# Patient Record
Sex: FEMALE | Race: OTHER | Employment: UNEMPLOYED | URBAN - METROPOLITAN AREA
[De-identification: names, ages, dates, MRNs, and addresses within clinical notes are randomized per-mention and may not be internally consistent; named-entity substitution may affect disease eponyms.]

---

## 2017-04-03 DIAGNOSIS — E03.1 CONGENITAL HYPOTHYROIDISM WITHOUT GOITER: ICD-10-CM

## 2017-04-03 DIAGNOSIS — F51.01 PRIMARY INSOMNIA: ICD-10-CM

## 2017-04-03 RX ORDER — SERTRALINE HYDROCHLORIDE 25 MG/1
25 TABLET, FILM COATED ORAL DAILY
Qty: 90 TAB | Refills: 1 | Status: SHIPPED | OUTPATIENT
Start: 2017-04-03 | End: 2017-04-24 | Stop reason: SDUPTHER

## 2017-04-03 RX ORDER — LEVOTHYROXINE SODIUM 200 UG/1
200 TABLET ORAL
Qty: 90 TAB | Refills: 1 | Status: SHIPPED | OUTPATIENT
Start: 2017-04-03 | End: 2017-04-24 | Stop reason: SDUPTHER

## 2017-04-03 NOTE — TELEPHONE ENCOUNTER
Patient requesting the following medication refill     Medication requesting:    levothyroxine (Synthroid) 200mcg tablet  Sertraline (Zoloft) 25mg tablet    Date last prescribed 11/03/2016    QTY 90 Refills 1    Date patient last seen 11/03/2016    Follow up appt scheduled for 04/24/2017    Please advise: Patient requesting pharmacy change.  Pharmacy updated in chart

## 2017-04-21 ENCOUNTER — HOSPITAL ENCOUNTER (OUTPATIENT)
Dept: LAB | Age: 52
Discharge: HOME | End: 2017-04-21
Payer: OTHER GOVERNMENT

## 2017-04-21 DIAGNOSIS — Z23 ENCOUNTER FOR IMMUNIZATION: ICD-10-CM

## 2017-04-21 DIAGNOSIS — E03.1 CONGENITAL HYPOTHYROIDISM WITHOUT GOITER: ICD-10-CM

## 2017-04-21 DIAGNOSIS — F51.01 PRIMARY INSOMNIA: ICD-10-CM

## 2017-04-21 DIAGNOSIS — E11.21 CONTROLLED TYPE 2 DIABETES MELLITUS WITH DIABETIC NEPHROPATHY, WITHOUT LONG-TERM CURRENT USE OF INSULIN (HCC): ICD-10-CM

## 2017-04-21 LAB
ALBUMIN SERPL BCP-MCNC: 3.9 G/DL (ref 3.4–5)
ALBUMIN/GLOB SERPL: 1.1 {RATIO} (ref 0.8–1.7)
ALP SERPL-CCNC: 75 U/L (ref 45–117)
ALT SERPL-CCNC: 27 U/L (ref 13–56)
ANION GAP BLD CALC-SCNC: 11 MMOL/L (ref 3–18)
APPEARANCE UR: ABNORMAL
AST SERPL W P-5'-P-CCNC: 13 U/L (ref 15–37)
BACTERIA URNS QL MICRO: ABNORMAL /HPF
BASOPHILS # BLD AUTO: 0 K/UL (ref 0–0.06)
BASOPHILS # BLD: 1 % (ref 0–2)
BILIRUB SERPL-MCNC: 0.3 MG/DL (ref 0.2–1)
BILIRUB UR QL: NEGATIVE
BUN SERPL-MCNC: 15 MG/DL (ref 7–18)
BUN/CREAT SERPL: 21 (ref 12–20)
CALCIUM SERPL-MCNC: 9.2 MG/DL (ref 8.5–10.1)
CHLORIDE SERPL-SCNC: 101 MMOL/L (ref 100–108)
CHOLEST SERPL-MCNC: 236 MG/DL
CO2 SERPL-SCNC: 26 MMOL/L (ref 21–32)
COLOR UR: YELLOW
CREAT SERPL-MCNC: 0.73 MG/DL (ref 0.6–1.3)
DIFFERENTIAL METHOD BLD: ABNORMAL
EOSINOPHIL # BLD: 0.1 K/UL (ref 0–0.4)
EOSINOPHIL NFR BLD: 2 % (ref 0–5)
EPITH CASTS URNS QL MICRO: ABNORMAL /LPF (ref 0–5)
ERYTHROCYTE [DISTWIDTH] IN BLOOD BY AUTOMATED COUNT: 13.6 % (ref 11.6–14.5)
GLOBULIN SER CALC-MCNC: 3.6 G/DL (ref 2–4)
GLUCOSE SERPL-MCNC: 162 MG/DL (ref 74–99)
GLUCOSE UR STRIP.AUTO-MCNC: NEGATIVE MG/DL
HCT VFR BLD AUTO: 41.2 % (ref 35–45)
HDLC SERPL-MCNC: 39 MG/DL (ref 40–60)
HDLC SERPL: 6.1 {RATIO} (ref 0–5)
HGB BLD-MCNC: 13.5 G/DL (ref 12–16)
HGB UR QL STRIP: NEGATIVE
KETONES UR QL STRIP.AUTO: NEGATIVE MG/DL
LDLC SERPL CALC-MCNC: 147.6 MG/DL (ref 0–100)
LEUKOCYTE ESTERASE UR QL STRIP.AUTO: ABNORMAL
LIPID PROFILE,FLP: ABNORMAL
LYMPHOCYTES # BLD AUTO: 26 % (ref 21–52)
LYMPHOCYTES # BLD: 1.7 K/UL (ref 0.9–3.6)
MCH RBC QN AUTO: 27.5 PG (ref 24–34)
MCHC RBC AUTO-ENTMCNC: 32.8 G/DL (ref 31–37)
MCV RBC AUTO: 83.9 FL (ref 74–97)
MONOCYTES # BLD: 0.3 K/UL (ref 0.05–1.2)
MONOCYTES NFR BLD AUTO: 4 % (ref 3–10)
NEUTS SEG # BLD: 4.4 K/UL (ref 1.8–8)
NEUTS SEG NFR BLD AUTO: 67 % (ref 40–73)
NITRITE UR QL STRIP.AUTO: NEGATIVE
PH UR STRIP: 7 [PH] (ref 5–8)
PLATELET # BLD AUTO: 266 K/UL (ref 135–420)
PMV BLD AUTO: 8.6 FL (ref 9.2–11.8)
POTASSIUM SERPL-SCNC: 4.2 MMOL/L (ref 3.5–5.5)
PROT SERPL-MCNC: 7.5 G/DL (ref 6.4–8.2)
PROT UR STRIP-MCNC: NEGATIVE MG/DL
RBC # BLD AUTO: 4.91 M/UL (ref 4.2–5.3)
RBC #/AREA URNS HPF: NEGATIVE /HPF (ref 0–5)
SODIUM SERPL-SCNC: 138 MMOL/L (ref 136–145)
SP GR UR REFRACTOMETRY: 1.02 (ref 1–1.03)
T4 FREE SERPL-MCNC: 1.7 NG/DL (ref 0.7–1.5)
TRIGL SERPL-MCNC: 247 MG/DL (ref ?–150)
TSH SERPL DL<=0.05 MIU/L-ACNC: 0.4 UIU/ML (ref 0.36–3.74)
UROBILINOGEN UR QL STRIP.AUTO: 1 EU/DL (ref 0.2–1)
VLDLC SERPL CALC-MCNC: 49.4 MG/DL
WBC # BLD AUTO: 6.6 K/UL (ref 4.6–13.2)
WBC URNS QL MICRO: ABNORMAL /HPF (ref 0–4)

## 2017-04-21 PROCEDURE — 81001 URINALYSIS AUTO W/SCOPE: CPT | Performed by: FAMILY MEDICINE

## 2017-04-21 PROCEDURE — 36415 COLL VENOUS BLD VENIPUNCTURE: CPT | Performed by: FAMILY MEDICINE

## 2017-04-21 PROCEDURE — 85025 COMPLETE CBC W/AUTO DIFF WBC: CPT | Performed by: FAMILY MEDICINE

## 2017-04-21 PROCEDURE — 84443 ASSAY THYROID STIM HORMONE: CPT | Performed by: FAMILY MEDICINE

## 2017-04-21 PROCEDURE — 80053 COMPREHEN METABOLIC PANEL: CPT | Performed by: FAMILY MEDICINE

## 2017-04-21 PROCEDURE — 80061 LIPID PANEL: CPT | Performed by: FAMILY MEDICINE

## 2017-04-21 PROCEDURE — 84439 ASSAY OF FREE THYROXINE: CPT | Performed by: FAMILY MEDICINE

## 2017-04-24 ENCOUNTER — OFFICE VISIT (OUTPATIENT)
Dept: FAMILY MEDICINE CLINIC | Age: 52
End: 2017-04-24

## 2017-04-24 VITALS
BODY MASS INDEX: 33.82 KG/M2 | SYSTOLIC BLOOD PRESSURE: 130 MMHG | TEMPERATURE: 97.4 F | WEIGHT: 203 LBS | RESPIRATION RATE: 16 BRPM | HEART RATE: 95 BPM | HEIGHT: 65 IN | DIASTOLIC BLOOD PRESSURE: 80 MMHG | OXYGEN SATURATION: 95 %

## 2017-04-24 DIAGNOSIS — Z00.00 ANNUAL PHYSICAL EXAM: Primary | ICD-10-CM

## 2017-04-24 DIAGNOSIS — F51.01 PRIMARY INSOMNIA: ICD-10-CM

## 2017-04-24 DIAGNOSIS — E11.21 CONTROLLED TYPE 2 DIABETES MELLITUS WITH DIABETIC NEPHROPATHY, WITHOUT LONG-TERM CURRENT USE OF INSULIN (HCC): ICD-10-CM

## 2017-04-24 DIAGNOSIS — E03.1 CONGENITAL HYPOTHYROIDISM WITHOUT GOITER: ICD-10-CM

## 2017-04-24 RX ORDER — SERTRALINE HYDROCHLORIDE 25 MG/1
25 TABLET, FILM COATED ORAL DAILY
Qty: 90 TAB | Refills: 1 | Status: SHIPPED | OUTPATIENT
Start: 2017-04-24 | End: 2017-10-25 | Stop reason: SDUPTHER

## 2017-04-24 RX ORDER — LANCETS
EACH MISCELLANEOUS
Qty: 1 EACH | Refills: 11 | Status: SHIPPED | OUTPATIENT
Start: 2017-04-24 | End: 2017-10-25 | Stop reason: SDUPTHER

## 2017-04-24 RX ORDER — LEVOTHYROXINE SODIUM 200 UG/1
200 TABLET ORAL
Qty: 90 TAB | Refills: 1 | Status: SHIPPED | OUTPATIENT
Start: 2017-04-24 | End: 2017-10-25 | Stop reason: SDUPTHER

## 2017-04-24 RX ORDER — METFORMIN HYDROCHLORIDE 500 MG/1
500 TABLET ORAL 2 TIMES DAILY WITH MEALS
Qty: 180 TAB | Refills: 4 | Status: SHIPPED | OUTPATIENT
Start: 2017-04-24 | End: 2017-10-25 | Stop reason: SDUPTHER

## 2017-04-24 RX ORDER — ZOLPIDEM TARTRATE 10 MG/1
10 TABLET ORAL
Qty: 90 TAB | Refills: 1 | Status: SHIPPED | OUTPATIENT
Start: 2017-04-24 | End: 2017-10-25 | Stop reason: SDUPTHER

## 2017-04-24 NOTE — PROGRESS NOTES
Sarah Viveros is a 47 y.o.  female and presents for a preventive health care visit   Subjective:  Health Maintenance History  Immunizations reviewed, none indicated. Mammogram : utd nl , dexascan: utd nl ,pap smear : na ,   colonoscopy: referred , Eye exam: na ,  Chest CT: utd ,    HPI ;    Patient Active Problem List    Diagnosis Date Noted    Congenital hypothyroidism without goiter 10/22/2015    Diabetes mellitus type 2, controlled ([facility] [address] 09/23/2015    Insomnia 06/30/2011     Current Outpatient Prescriptions   Medication Sig Dispense Refill    sertraline (ZOLOFT) 25 mg tablet Take 1 Tab by mouth daily. 90 Tab 1    levothyroxine (SYNTHROID) 200 mcg tablet Take 1 Tab by mouth Daily (before breakfast). 90 Tab 1    zolpidem (AMBIEN) 10 mg tablet Take 1 Tab by mouth nightly as needed. 90 Tab 1    metFORMIN (GLUCOPHAGE) 500 mg tablet Take 1 Tab by mouth two (2) times daily (with meals).  180 Tab 4    glucose blood VI test strips (BLOOD GLUCOSE TEST) strip Use 4 per week 100 Strip 11    Lancets misc Use 4 times a week 1 Each 11    Blood-Glucose Meter monitoring kit Test sugar twice a day twice a week 1 Kit 0     No Known Allergies  Past Medical History:   Diagnosis Date    Hypothyroid 9/16/2010    Insomnia 6/30/2011    Thyroid disease      Past Surgical History:   Procedure Laterality Date    HX GYN      hysterectomy     Family History   Problem Relation Age of Onset    Cancer Father     Breast Cancer Sister      Social History   Substance Use Topics    Smoking status: Never Smoker    Smokeless tobacco: Not on file    Alcohol use Yes       ROS       General: negative for - chills, fatigue, fever, weight change  Psych: negative for - anxiety, depression, irritability or mood swings  ENT: negative for - headaches, hearing change, nasal congestion, oral lesions, sneezing or sore throat  Heme/ Lymph: negative for - bleeding problems, bruising, pallor or swollen lymph nodes  Endo: negative for - hot flashes, polydipsia/polyuria or temperature intolerance  Resp: negative for - cough, shortness of breath or wheezing  CV: negative for - chest pain, edema or palpitations  GI: negative for - abdominal pain, change in bowel habits, constipation, diarrhea or nausea/vomiting  : negative for - dysuria, hematuria, incontinence, pelvic pain or vulvar/vaginal symptoms  MSK: negative for - joint pain, joint swelling or muscle pain  Neuro: negative for - confusion, headaches, seizures or weakness  Derm: negative for - dry skin, hair changes, rash or skin lesion changes      Objective:  Vitals:    04/24/17 1235   BP: 130/80   Pulse: 95   Resp: 16   Temp: 97.4 °F (36.3 °C)   TempSrc: Oral   SpO2: 95%   Weight: 203 lb (92.1 kg)   Height: 5' 5\" (1.651 m)   PainSc:   0 - No pain       alert, well appearing, and in no distress, oriented to person, place, and time and normal appearing weight  Mental status - alert, oriented to person, place, and time  Eyes - pupils equal and reactive, extraocular eye movements intact  Ears - bilateral TM's and external ear canals normal  Nose - normal and patent, no erythema, discharge or polyps  Mouth - mucous membranes moist, pharynx normal without lesions  Neck - supple, no significant adenopathy  Lymphatics - no palpable lymphadenopathy, no hepatosplenomegaly  Chest - clear to auscultation, no wheezes, rales or rhonchi, symmetric air entry  Heart - normal rate, regular rhythm, normal S1, S2, no murmurs, rubs, clicks or gallops  Abdomen - soft, nontender, nondistended, no masses or organomegaly  Breasts - breasts appear normal, no suspicious masses, no skin or nipple changes or axillary nodes  Pelvic - normal external genitalia, vulva, vagina, cervix, uterus and adnexa  Rectal - normal rectal, no masses  Back exam - full range of motion, no tenderness, palpable spasm or pain on motion  Neurological - alert, oriented, normal speech, no focal findings or movement disorder noted  Musculoskeletal - no joint tenderness, deformity or swelling  Extremities - peripheral pulses normal, no pedal edema, no clubbing or cyanosis  Skin - normal coloration and turgor, no rashes, no suspicious skin lesions noted        LABS  Component      Latest Ref Rng & Units 4/21/2017 4/21/2017 4/21/2017 4/21/2017           7:55 AM  7:55 AM  7:55 AM  7:55 AM   WBC      4.6 - 13.2 K/uL 6.6      RBC      4.20 - 5.30 M/uL 4.91      HGB      12.0 - 16.0 g/dL 13.5      HCT      35.0 - 45.0 % 41.2      MCV      74.0 - 97.0 FL 83.9      MCH      24.0 - 34.0 PG 27.5      MCHC      31.0 - 37.0 g/dL 32.8      RDW      11.6 - 14.5 % 13.6      PLATELET      375 - 791 K/uL 266      MPV      9.2 - 11.8 FL 8.6 (L)      NEUTROPHILS      40 - 73 % 67      LYMPHOCYTES      21 - 52 % 26      MONOCYTES      3 - 10 % 4      EOSINOPHILS      0 - 5 % 2      BASOPHILS      0 - 2 % 1      ABS. NEUTROPHILS      1.8 - 8.0 K/UL 4.4      ABS. LYMPHOCYTES      0.9 - 3.6 K/UL 1.7      ABS. MONOCYTES      0.05 - 1.2 K/UL 0.3      ABS. EOSINOPHILS      0.0 - 0.4 K/UL 0.1      ABS. BASOPHILS      0.0 - 0.06 K/UL 0.0      DF       AUTOMATED      Sodium      136 - 145 mmol/L       Potassium      3.5 - 5.5 mmol/L       Chloride      100 - 108 mmol/L       CO2      21 - 32 mmol/L       Anion gap      3.0 - 18 mmol/L       Glucose      74 - 99 mg/dL       BUN      7.0 - 18 MG/DL       Creatinine      0.6 - 1.3 MG/DL       BUN/Creatinine ratio      12 - 20         GFR est AA      >60 ml/min/1.73m2       GFR est non-AA      >60 ml/min/1.73m2       Calcium      8.5 - 10.1 MG/DL       Bilirubin, total      0.2 - 1.0 MG/DL       ALT      13 - 56 U/L       AST      15 - 37 U/L       Alk.  phosphatase      45 - 117 U/L       Protein, total      6.4 - 8.2 g/dL       Albumin      3.4 - 5.0 g/dL       Globulin      2.0 - 4.0 g/dL       A-G Ratio      0.8 - 1.7         Color         YELLOW    Appearance         CLOUDY    Specific gravity      1.005 - 1.030 1.019    pH (UA)      5.0 - 8.0     7.0    Protein      NEG mg/dL   NEGATIVE    Glucose      NEG mg/dL   NEGATIVE    Ketone      NEG mg/dL   NEGATIVE    Bilirubin      NEG     NEGATIVE    Blood      NEG     NEGATIVE    Urobilinogen      0.2 - 1.0 EU/dL   1.0    Nitrites      NEG     NEGATIVE    Leukocyte Esterase      NEG     TRACE (A)    Cholesterol, total      <200 MG/DL       Triglyceride      <150 MG/DL       HDL Cholesterol      40 - 60 MG/DL       LDL, calculated      0 - 100 MG/DL       VLDL, calculated      MG/DL       CHOL/HDL Ratio      0 - 5.0         WBC      0 - 4 /hpf  4 to 10     RBC      0 - 5 /hpf  NEGATIVE     Epithelial cells      0 - 5 /lpf  FEW     Bacteria      NEG /hpf  4+ (A)     TSH      0.36 - 3.74 uIU/mL       T4, Free      0.7 - 1.5 NG/DL    1.7 (H)     Component      Latest Ref Rng & Units 4/21/2017 4/21/2017 4/21/2017           7:55 AM  7:55 AM  7:55 AM   WBC      4.6 - 13.2 K/uL      RBC      4.20 - 5.30 M/uL      HGB      12.0 - 16.0 g/dL      HCT      35.0 - 45.0 %      MCV      74.0 - 97.0 FL      MCH      24.0 - 34.0 PG      MCHC      31.0 - 37.0 g/dL      RDW      11.6 - 14.5 %      PLATELET      159 - 311 K/uL      MPV      9.2 - 11.8 FL      NEUTROPHILS      40 - 73 %      LYMPHOCYTES      21 - 52 %      MONOCYTES      3 - 10 %      EOSINOPHILS      0 - 5 %      BASOPHILS      0 - 2 %      ABS. NEUTROPHILS      1.8 - 8.0 K/UL      ABS. LYMPHOCYTES      0.9 - 3.6 K/UL      ABS. MONOCYTES      0.05 - 1.2 K/UL      ABS. EOSINOPHILS      0.0 - 0.4 K/UL      ABS.  BASOPHILS      0.0 - 0.06 K/UL      DF            Sodium      136 - 145 mmol/L   138   Potassium      3.5 - 5.5 mmol/L   4.2   Chloride      100 - 108 mmol/L   101   CO2      21 - 32 mmol/L   26   Anion gap      3.0 - 18 mmol/L   11   Glucose      74 - 99 mg/dL   162 (H)   BUN      7.0 - 18 MG/DL   15   Creatinine      0.6 - 1.3 MG/DL   0.73   BUN/Creatinine ratio      12 - 20     21 (H)   GFR est AA      >60 ml/min/1.73m2 >60   GFR est non-AA      >60 ml/min/1.73m2   >60   Calcium      8.5 - 10.1 MG/DL   9.2   Bilirubin, total      0.2 - 1.0 MG/DL   0.3   ALT      13 - 56 U/L   27   AST      15 - 37 U/L   13 (L)   Alk. phosphatase      45 - 117 U/L   75   Protein, total      6.4 - 8.2 g/dL   7.5   Albumin      3.4 - 5.0 g/dL   3.9   Globulin      2.0 - 4.0 g/dL   3.6   A-G Ratio      0.8 - 1.7     1.1   Color            Appearance            Specific gravity      1.005 - 1.030        pH (UA)      5.0 - 8.0        Protein      NEG mg/dL      Glucose      NEG mg/dL      Ketone      NEG mg/dL      Bilirubin      NEG        Blood      NEG        Urobilinogen      0.2 - 1.0 EU/dL      Nitrites      NEG        Leukocyte Esterase      NEG        Cholesterol, total      <200 MG/DL  236 (H)    Triglyceride      <150 MG/DL  247 (H)    HDL Cholesterol      40 - 60 MG/DL  39 (L)    LDL, calculated      0 - 100 MG/DL  147.6 (H)    VLDL, calculated      MG/DL  49.4    CHOL/HDL Ratio      0 - 5.0    6.1 (H)    WBC      0 - 4 /hpf      RBC      0 - 5 /hpf      Epithelial cells      0 - 5 /lpf      Bacteria      NEG /hpf      TSH      0.36 - 3.74 uIU/mL 0.40     T4, Free      0.7 - 1.5 NG/DL        TESTS  ekg  nsr      Assessment/Plan:    Health Maintenance up to date. Recommend f/u physical 1 year. Routine screening labs/tests recommended prior to next physical.              I have discussed the diagnosis with the patient and the intended plan as seen in the above orders. The patient has received an after-visit summary and questions were answered concerning future plans. I have discussed medication side effects and warnings with the patient as well. I have reviewed the plan of care with the patient, accepted their input and they are in agreement with the treatment goals.          Follow-up Disposition:  Return in about 6 months (around 10/24/2017) for rov, labs prior.        -------------------------------------------------------------------------------------------------------------------    Problem Assessment  and also with   Chief Complaint   Patient presents with    Diabetes    Thyroid Problem    Insomnia         HPI ;     Additional Concerns:              Assessment/Plan:      Diabetes - well controlled, stable  Thyroid stable  insomnnia statommy          Lab review: labs are reviewed, up to date and normal

## 2017-04-24 NOTE — MR AVS SNAPSHOT
Visit Information Date & Time Provider Department Dept. Phone Encounter #  
 4/24/2017 12:30  [facility] [phone] [id number] Follow-up Instructions Return in about 6 months (around 10/24/2017) for rov, labs prior. Upcoming Health Maintenance Date Due  
 EYE EXAM RETINAL OR DILATED Q1 3/15/2012 FOBT Q 1 YEAR AGE 50-75 4/4/2016 HEMOGLOBIN A1C Q6M 5/1/2017 MICROALBUMIN Q1 11/1/2017 FOOT EXAM Q1 11/3/2017 LIPID PANEL Q1 4/21/2018 BREAST CANCER SCRN MAMMOGRAM 7/7/2018 DTaP/Tdap/Td series (2 - Td) 5/16/2026 Allergies as of 4/24/2017  Review Complete On: 4/24/2017 By: Ramesh Ledesma., DO No Known Allergies Current Immunizations  Reviewed on 9/23/2015 Name Date Influenza Vaccine 10/9/2014, 12/5/2013  8:30 AM, 12/6/2012 Influenza Vaccine [place] [employer]) 9/23/2015 Influenza Vaccine (Quad) PF 11/3/2016 Influenza Vaccine Split 10/4/2011 Pneumococcal Conjugate (PCV-13) 9/23/2015 Pneumococcal Polysaccharide (PPSV-23) 11/3/2016 TD Vaccine 9/16/2007 Tdap 5/16/2016 Not reviewed this visit You Were Diagnosed With   
  
 Codes Comments Annual physical exam    -  Primary ICD-10-CM: Z00.00 ICD-9-CM: V70.0 Congenital hypothyroidism without goiter     ICD-10-CM: E03.1 ICD-9-CM: 627 Primary insomnia     ICD-10-CM: F51.01 
ICD-9-CM: 307.42 Controlled type 2 diabetes mellitus with diabetic nephropathy, without long-term current use of insulin (HCC)     ICD-10-CM: E11.21 
ICD-9-CM: 250.40, 583.81 Vitals BP Pulse Temp Resp Height(growth percentile) Weight(growth percentile) 130/80 (BP 1 Location: Left arm, BP Patient Position: Sitting) 95 97.4 °F (36.3 °C) (Oral) 16 5' 5\" (1.651 m) 203 lb (92.1 kg) SpO2 BMI OB Status 95% 33.78 kg/m2 Hysterectomy Vitals History BMI and BSA Data Body Mass Index Body Surface Area  33.78 kg/m 2 2.06 m 2  
  
  
 Preferred Pharmacy Pharmacy Name Phone [address] [phone] Your Updated Medication List  
  
   
This list is accurate as of: 4/24/17 12:59 PM.  Always use your most recent med list.  
  
  
  
  
 Blood-Glucose Meter monitoring kit Test sugar twice a day twice a week  
  
 glucose blood VI test strips strip Commonly known as:  blood glucose test  
Use 4 per week Lancets Misc Use 4 times a week  
  
 levothyroxine 200 mcg tablet Commonly known as:  SYNTHROID Take 1 Tab by mouth Daily (before breakfast). metFORMIN 500 mg tablet Commonly known as:  GLUCOPHAGE Take 1 Tab by mouth two (2) times daily (with meals). sertraline 25 mg tablet Commonly known as:  ZOLOFT Take 1 Tab by mouth daily. zolpidem 10 mg tablet Commonly known as:  AMBIEN Take 1 Tab by mouth nightly as needed. Prescriptions Printed Refills  
 sertraline (ZOLOFT) 25 mg tablet 1 Sig: Take 1 Tab by mouth daily. Class: Print Route: Oral  
 levothyroxine (SYNTHROID) 200 mcg tablet 1 Sig: Take 1 Tab by mouth Daily (before breakfast). Class: Print Route: Oral  
 zolpidem (AMBIEN) 10 mg tablet 1 Sig: Take 1 Tab by mouth nightly as needed. Class: Print Route: Oral  
 metFORMIN (GLUCOPHAGE) 500 mg tablet 4 Sig: Take 1 Tab by mouth two (2) times daily (with meals). Class: Print Route: Oral  
 glucose blood VI test strips (BLOOD GLUCOSE TEST) strip 11 Sig: Use 4 per week Class: Print Lancets misc 11 Sig: Use 4 times a week Class: Print We Performed the Following AMB POC EKG ROUTINE W/ 12 LEADS, INTER & REP [90780 CPT(R)] REFERRAL TO GASTROENTEROLOGY [IXV09 Custom] Comments:  
 Please evaluate patient for routine screening colnoscopy. 1st available is fine. Follow-up Instructions Return in about 6 months (around 10/24/2017) for rov, labs prior. To-Do List   
 10/21/2017 Lab:  HEMOGLOBIN A1C WITH EAG Around 10/21/2017 Lab:  LIPID PANEL Around 10/21/2017 Lab:  METABOLIC PANEL, COMPREHENSIVE   
  
 10/21/2017 Lab:  MICROALBUMIN, UR, RAND W/ MICROALBUMIN/CREA RATIO Around 10/21/2017 Lab:  T4, FREE Around 10/21/2017 Lab:  TSH 3RD GENERATION Referral Information Referral ID Referred By Referred To  
  
 [id number] Max Flowers MD Lenoard Sluder    
   [address] [facility], [address] [place] [address] Road Phone: [phone] Fax: [phone] Visits Status Start Date End Date 1 New Request 4/24/17 4/24/18 If your referral has a status of pending review or denied, additional information will be sent to support the outcome of this decision. Introducing [facility]! Dear Elenita Velazquez: Thank you for requesting a [employer] account. Our records indicate that you already have an active [employer] account. You can access your account anytime at [email or web address] Did you know that you can access your hospital and ER discharge instructions at any time in [employer]? You can also review all of your test results from your hospital stay or ER visit. Additional Information If you have questions, please visit the Frequently Asked Questions section of the [employer] website at [email or web address] Remember, [employer] is NOT to be used for urgent needs. For medical emergencies, dial 911. Now available from your iPhone and Android! Please provide this summary of care documentation to your next provider. Your primary care clinician is listed as [address]. If you have any questions after today's visit, please call [phone].

## 2017-04-24 NOTE — PROGRESS NOTES
Umesh Simon is a 47 y.o. female presented to clinic for an annual physical. Pt denies any pain or concerns at this time. 1. Have you been to the ER, urgent care clinic since your last visit? Hospitalized since your last visit? No    2. Have you seen or consulted any other health care providers outside of the [address] [place] [address] since your last visit? Include any pap smears or colon screening.  No     Learning Assessment 3/6/2013   PRIMARY LEARNER Patient   PRIMARY LANGUAGE ENGLISH   LEARNER PREFERENCE PRIMARY DEMONSTRATION   ANSWERED BY patient   RELATIONSHIP SELF

## 2017-05-03 DIAGNOSIS — F51.01 PRIMARY INSOMNIA: ICD-10-CM

## 2017-05-03 DIAGNOSIS — E03.1 CONGENITAL HYPOTHYROIDISM WITHOUT GOITER: ICD-10-CM

## 2017-05-03 DIAGNOSIS — E11.21 CONTROLLED TYPE 2 DIABETES MELLITUS WITH DIABETIC NEPHROPATHY, WITHOUT LONG-TERM CURRENT USE OF INSULIN (HCC): ICD-10-CM

## 2017-05-03 DIAGNOSIS — Z00.00 ANNUAL PHYSICAL EXAM: ICD-10-CM

## 2017-05-03 RX ORDER — SERTRALINE HYDROCHLORIDE 25 MG/1
TABLET, FILM COATED ORAL
Qty: 90 TAB | Refills: 0 | OUTPATIENT
Start: 2017-05-03

## 2017-05-22 ENCOUNTER — TELEPHONE (OUTPATIENT)
Dept: FAMILY MEDICINE CLINIC | Age: 52
End: 2017-05-22

## 2017-05-22 NOTE — TELEPHONE ENCOUNTER
Patients is calling stating that the medications that were printed off for her the last visit (4/24/17) needs to [facility] [phone]    Patient would like to know what she should do with  The hard copies. Please call to direct her on how to dispose of these.

## 2017-05-22 NOTE — TELEPHONE ENCOUNTER
2 patient identifiers verified. Spoke with Yvette Campoverde Patient made aware of doctors note.  Patient acknowledges understanding and voices no concerns at this time

## 2017-05-25 ENCOUNTER — TELEPHONE (OUTPATIENT)
Dept: FAMILY MEDICINE CLINIC | Age: 52
End: 2017-05-25

## 2017-05-25 RX ORDER — LEVOTHYROXINE SODIUM 200 UG/1
200 TABLET ORAL
Qty: 90 TAB | Refills: 1 | OUTPATIENT
Start: 2017-05-25

## 2017-05-25 NOTE — TELEPHONE ENCOUNTER
Pharmacy request for patients L-thyroxine tab.  Patient to take 1- 200mg tab by mouth daily before breakfast. Sent to MD.

## 2017-05-25 NOTE — TELEPHONE ENCOUNTER
Pt called stating that [facility] never received the e-script Dr. Joellyn Bloch sent over for all of her medication.  Please assist.

## 2017-05-30 ENCOUNTER — TELEPHONE (OUTPATIENT)
Dept: FAMILY MEDICINE CLINIC | Age: 52
End: 2017-05-30

## 2017-05-30 NOTE — TELEPHONE ENCOUNTER
Pt is requesting all the Rx's she got at last visit be faxed into [facility] to be filled. Please advise if this can be done.

## 2017-05-30 NOTE — TELEPHONE ENCOUNTER
2 patient identifiers verified. Spoke with Willie Lozano made aware that all her medication refills on her last office visit was sent electronically to [address] [place], [place] [address]y 9 E. MD will not resend already prescribed medications.   Patient acknowledges understanding and voices no concerns at this time

## 2017-06-09 DIAGNOSIS — F51.01 PRIMARY INSOMNIA: ICD-10-CM

## 2017-06-09 DIAGNOSIS — E03.1 CONGENITAL HYPOTHYROIDISM WITHOUT GOITER: ICD-10-CM

## 2017-06-09 DIAGNOSIS — E11.21 CONTROLLED TYPE 2 DIABETES MELLITUS WITH DIABETIC NEPHROPATHY, WITHOUT LONG-TERM CURRENT USE OF INSULIN (HCC): ICD-10-CM

## 2017-06-09 DIAGNOSIS — Z00.00 ANNUAL PHYSICAL EXAM: ICD-10-CM

## 2017-06-09 NOTE — TELEPHONE ENCOUNTER
Prescriptions need to be sent to the [facility] [phone]     Please assist with the order for the patient.        Please also call in a 30 day supply for all three of the medications to the Home (phone) [phone]

## 2017-06-12 RX ORDER — ZOLPIDEM TARTRATE 10 MG/1
10 TABLET ORAL
Qty: 90 TAB | Refills: 1 | OUTPATIENT
Start: 2017-06-12

## 2017-06-12 RX ORDER — LEVOTHYROXINE SODIUM 200 UG/1
200 TABLET ORAL
Qty: 90 TAB | Refills: 1 | OUTPATIENT
Start: 2017-06-12

## 2017-06-12 RX ORDER — SERTRALINE HYDROCHLORIDE 25 MG/1
25 TABLET, FILM COATED ORAL DAILY
Qty: 90 TAB | Refills: 1 | OUTPATIENT
Start: 2017-06-12

## 2017-07-10 ENCOUNTER — HOSPITAL ENCOUNTER (OUTPATIENT)
Dept: MAMMOGRAPHY | Age: 52
Discharge: HOME | End: 2017-07-10
Attending: FAMILY MEDICINE
Payer: OTHER GOVERNMENT

## 2017-07-10 DIAGNOSIS — Z12.31 VISIT FOR SCREENING MAMMOGRAM: ICD-10-CM

## 2017-07-10 PROCEDURE — 77067 SCR MAMMO BI INCL CAD: CPT

## 2017-08-04 RX ORDER — LANCETS 28 GAUGE
EACH MISCELLANEOUS
Qty: 100 LANCET | Refills: 10 | Status: SHIPPED | OUTPATIENT
Start: 2017-08-04 | End: 2017-10-25 | Stop reason: SDUPTHER

## 2017-10-19 ENCOUNTER — HOSPITAL ENCOUNTER (OUTPATIENT)
Dept: LAB | Age: 52
Discharge: HOME | End: 2017-10-19
Payer: OTHER GOVERNMENT

## 2017-10-19 DIAGNOSIS — Z00.00 ANNUAL PHYSICAL EXAM: ICD-10-CM

## 2017-10-19 DIAGNOSIS — E11.21 CONTROLLED TYPE 2 DIABETES MELLITUS WITH DIABETIC NEPHROPATHY, WITHOUT LONG-TERM CURRENT USE OF INSULIN (HCC): ICD-10-CM

## 2017-10-19 DIAGNOSIS — F51.01 PRIMARY INSOMNIA: ICD-10-CM

## 2017-10-19 DIAGNOSIS — E03.1 CONGENITAL HYPOTHYROIDISM WITHOUT GOITER: ICD-10-CM

## 2017-10-19 LAB
ALBUMIN SERPL-MCNC: 3.7 G/DL (ref 3.4–5)
ALBUMIN/GLOB SERPL: 1.1 {RATIO} (ref 0.8–1.7)
ALP SERPL-CCNC: 76 U/L (ref 45–117)
ALT SERPL-CCNC: 21 U/L (ref 13–56)
ANION GAP SERPL CALC-SCNC: 9 MMOL/L (ref 3–18)
AST SERPL-CCNC: 14 U/L (ref 15–37)
BILIRUB SERPL-MCNC: 0.3 MG/DL (ref 0.2–1)
BUN SERPL-MCNC: 11 MG/DL (ref 7–18)
BUN/CREAT SERPL: 16 (ref 12–20)
CALCIUM SERPL-MCNC: 8.5 MG/DL (ref 8.5–10.1)
CHLORIDE SERPL-SCNC: 99 MMOL/L (ref 100–108)
CHOLEST SERPL-MCNC: 229 MG/DL
CO2 SERPL-SCNC: 28 MMOL/L (ref 21–32)
CREAT SERPL-MCNC: 0.68 MG/DL (ref 0.6–1.3)
CREAT UR-MCNC: 45.38 MG/DL (ref 30–125)
EST. AVERAGE GLUCOSE BLD GHB EST-MCNC: 189 MG/DL
GLOBULIN SER CALC-MCNC: 3.4 G/DL (ref 2–4)
GLUCOSE SERPL-MCNC: 158 MG/DL (ref 74–99)
HBA1C MFR BLD: 8.2 % (ref 4.2–5.6)
HDLC SERPL-MCNC: 41 MG/DL (ref 40–60)
HDLC SERPL: 5.6 {RATIO} (ref 0–5)
LDLC SERPL CALC-MCNC: 128.4 MG/DL (ref 0–100)
LIPID PROFILE,FLP: ABNORMAL
MICROALBUMIN UR-MCNC: 1.6 MG/DL (ref 0–3)
MICROALBUMIN/CREAT UR-RTO: 35 MG/G (ref 0–30)
POTASSIUM SERPL-SCNC: 4.1 MMOL/L (ref 3.5–5.5)
PROT SERPL-MCNC: 7.1 G/DL (ref 6.4–8.2)
SODIUM SERPL-SCNC: 136 MMOL/L (ref 136–145)
T4 FREE SERPL-MCNC: 1.5 NG/DL (ref 0.7–1.5)
TRIGL SERPL-MCNC: 298 MG/DL (ref ?–150)
TSH SERPL DL<=0.05 MIU/L-ACNC: 1.77 UIU/ML (ref 0.36–3.74)
VLDLC SERPL CALC-MCNC: 59.6 MG/DL

## 2017-10-19 PROCEDURE — 83036 HEMOGLOBIN GLYCOSYLATED A1C: CPT | Performed by: FAMILY MEDICINE

## 2017-10-19 PROCEDURE — 84439 ASSAY OF FREE THYROXINE: CPT | Performed by: FAMILY MEDICINE

## 2017-10-19 PROCEDURE — 80061 LIPID PANEL: CPT | Performed by: FAMILY MEDICINE

## 2017-10-19 PROCEDURE — 36415 COLL VENOUS BLD VENIPUNCTURE: CPT | Performed by: FAMILY MEDICINE

## 2017-10-19 PROCEDURE — 80053 COMPREHEN METABOLIC PANEL: CPT | Performed by: FAMILY MEDICINE

## 2017-10-19 PROCEDURE — 84443 ASSAY THYROID STIM HORMONE: CPT | Performed by: FAMILY MEDICINE

## 2017-10-19 PROCEDURE — 82043 UR ALBUMIN QUANTITATIVE: CPT | Performed by: FAMILY MEDICINE

## 2017-10-25 ENCOUNTER — OFFICE VISIT (OUTPATIENT)
Dept: FAMILY MEDICINE CLINIC | Age: 52
End: 2017-10-25

## 2017-10-25 VITALS
BODY MASS INDEX: 33.79 KG/M2 | HEIGHT: 65 IN | SYSTOLIC BLOOD PRESSURE: 124 MMHG | RESPIRATION RATE: 16 BRPM | WEIGHT: 202.8 LBS | OXYGEN SATURATION: 99 % | DIASTOLIC BLOOD PRESSURE: 84 MMHG | HEART RATE: 82 BPM | TEMPERATURE: 97.9 F

## 2017-10-25 DIAGNOSIS — E03.1 CONGENITAL HYPOTHYROIDISM WITHOUT GOITER: ICD-10-CM

## 2017-10-25 DIAGNOSIS — E11.21 CONTROLLED TYPE 2 DIABETES MELLITUS WITH DIABETIC NEPHROPATHY, WITHOUT LONG-TERM CURRENT USE OF INSULIN (HCC): ICD-10-CM

## 2017-10-25 DIAGNOSIS — J02.9 SORE THROAT: Primary | ICD-10-CM

## 2017-10-25 DIAGNOSIS — F51.01 PRIMARY INSOMNIA: ICD-10-CM

## 2017-10-25 LAB
QUICKVUE INFLUENZA TEST: NEGATIVE
S PYO AG THROAT QL: NEGATIVE
VALID INTERNAL CONTROL?: YES
VALID INTERNAL CONTROL?: YES

## 2017-10-25 RX ORDER — LANCETS 28 GAUGE
EACH MISCELLANEOUS
Qty: 100 LANCET | Refills: 10 | Status: SHIPPED | OUTPATIENT
Start: 2017-10-25 | End: 2018-05-16

## 2017-10-25 RX ORDER — LEVOTHYROXINE SODIUM 200 UG/1
200 TABLET ORAL
Qty: 90 TAB | Refills: 1 | Status: SHIPPED | OUTPATIENT
Start: 2017-10-25 | End: 2018-05-16 | Stop reason: SDUPTHER

## 2017-10-25 RX ORDER — METFORMIN HYDROCHLORIDE 500 MG/1
500 TABLET ORAL 2 TIMES DAILY WITH MEALS
Qty: 180 TAB | Refills: 4 | Status: SHIPPED | OUTPATIENT
Start: 2017-10-25 | End: 2018-05-16 | Stop reason: SDUPTHER

## 2017-10-25 RX ORDER — SERTRALINE HYDROCHLORIDE 25 MG/1
25 TABLET, FILM COATED ORAL DAILY
Qty: 90 TAB | Refills: 1 | Status: SHIPPED | OUTPATIENT
Start: 2017-10-25 | End: 2018-05-16 | Stop reason: SDUPTHER

## 2017-10-25 RX ORDER — LANCETS
EACH MISCELLANEOUS
Qty: 1 EACH | Refills: 11 | Status: SHIPPED | OUTPATIENT
Start: 2017-10-25 | End: 2018-05-16

## 2017-10-25 RX ORDER — ZOLPIDEM TARTRATE 10 MG/1
10 TABLET ORAL
Qty: 90 TAB | Refills: 1 | Status: SHIPPED | OUTPATIENT
Start: 2017-10-25 | End: 2018-05-16 | Stop reason: SDUPTHER

## 2017-10-25 NOTE — PROGRESS NOTES
Evelyn Miles is a 47 y.o. female presents today for follow up on her diabetes and thyroid. She would also like to discuss having a sore throat for 2 days. Patient would like to discuss her recent labs. Pt is in Room # 6        1. Have you been to the ER, urgent care clinic since your last visit? Hospitalized since your last visit? No    2. Have you seen or consulted any other health care providers outside of the [address] [place] [address] since your last visit? Include any pap smears or colon screening.  No

## 2017-10-25 NOTE — PROGRESS NOTES
Clare Mulligan is a 47 y.o.  female and presents with    Chief Complaint   Patient presents with    Sore Throat           Subjective:  Upper Respiratory Infection  Patient complains of symptoms of a URI. Symptoms include congestion and coryza. Onset of symptoms was 1 hours ago, stable since that time. She also c/o achiness for the past 4 days . She is drinking plenty of fluids. . Evaluation to date: none. Treatment to date: none. Cardiovascular Review:  The patient has hypertension and hyperlipidemia. Diet and Lifestyle: not attempting to follow a low fat, low cholesterol diet, not attempting to follow a low sodium diet  Home BP Monitoring: is not measured at home. Pertinent ROS: taking medications as instructed, no medication side effects noted, no TIA's, no chest pain on exertion, no dyspnea on exertion, no swelling of ankles. Additional Concerns:          Patient Active Problem List    Diagnosis Date Noted    Congenital hypothyroidism without goiter 10/22/2015    Diabetes mellitus type 2, controlled ([facility] [address] 09/23/2015    Insomnia 06/30/2011     Current Outpatient Prescriptions   Medication Sig Dispense Refill    lancets (FREESTYLE LANCETS) 28 gauge misc USE 4 TIMES A WEEK 100 Lancet 10    sertraline (ZOLOFT) 25 mg tablet Take 1 Tab by mouth daily. 90 Tab 1    levothyroxine (SYNTHROID) 200 mcg tablet Take 1 Tab by mouth Daily (before breakfast). 90 Tab 1    zolpidem (AMBIEN) 10 mg tablet Take 1 Tab by mouth nightly as needed. 90 Tab 1    metFORMIN (GLUCOPHAGE) 500 mg tablet Take 1 Tab by mouth two (2) times daily (with meals).  180 Tab 4    glucose blood VI test strips (BLOOD GLUCOSE TEST) strip Use 4 per week 100 Strip 11    Lancets misc Use 4 times a week 1 Each 11    Blood-Glucose Meter monitoring kit Test sugar twice a day twice a week 1 Kit 0     No Known Allergies  Past Medical History:   Diagnosis Date    Hypothyroid 9/16/2010    Insomnia 6/30/2011    Thyroid disease Past Surgical History:   Procedure Laterality Date    HX GYN      hysterectomy     Family History   Problem Relation Age of Onset    Breast Cancer Sister 48    Cancer Father      Social History   Substance Use Topics    Smoking status: Never Smoker    Smokeless tobacco: Not on file    Alcohol use Yes       ROS       All other systems reviewed and are negative. Objective:Vitals:    10/25/17 1228   BP: 124/84   Pulse: 82   Resp: 16   Temp: 97.9 °F (36.6 °C)   TempSrc: Oral   SpO2: 99%   Weight: 202 lb 12.8 oz (92 kg)   Height: 5' 5\" (1.651 m)   PainSc:   0 - No pain                 alert, well appearing, and in no distress, oriented to person, place, and time and normal appearing weight  Chest - clear to auscultation, no wheezes, rales or rhonchi, symmetric air entry  Heart - normal rate, regular rhythm, normal S1, S2, no murmurs, rubs, clicks or gallops  Abdomen - soft, nontender, nondistended, no masses or organomegaly        LABS   Component      Latest Ref Rng & Units 10/25/2017 10/25/2017 10/19/2017          12:50 PM 12:50 PM  7:46 AM   Sodium      136 - 145 mmol/L      Potassium      3.5 - 5.5 mmol/L      Chloride      100 - 108 mmol/L      CO2      21 - 32 mmol/L      Anion gap      3.0 - 18 mmol/L      Glucose      74 - 99 mg/dL      BUN      7.0 - 18 MG/DL      Creatinine      0.6 - 1.3 MG/DL      BUN/Creatinine ratio      12 - 20        GFR est AA      >60 ml/min/1.73m2      GFR est non-AA      >60 ml/min/1.73m2      Calcium      8.5 - 10.1 MG/DL      Bilirubin, total      0.2 - 1.0 MG/DL      ALT (SGPT)      13 - 56 U/L      AST      15 - 37 U/L      Alk.  phosphatase      45 - 117 U/L      Protein, total      6.4 - 8.2 g/dL      Albumin      3.4 - 5.0 g/dL      Globulin      2.0 - 4.0 g/dL      A-G Ratio      0.8 - 1.7        Cholesterol, total      <200 MG/DL      Triglyceride      <150 MG/DL      HDL Cholesterol      40 - 60 MG/DL      LDL, calculated      0 - 100 MG/DL      VLDL, calculated      MG/DL      CHOL/HDL Ratio      0 - 5.0        Microalbumin,urine random      0 - 3.0 MG/DL   1.60   Creatinine, urine      30 - 125 mg/dL   45.38   Microalbumin/Creat. Ratio      0 - 30 mg/g   35 (H)   Hemoglobin A1c, (calculated)      4.2 - 5.6 %      Est. average glucose      mg/dL      VALID INTERNAL CONTROL POC       Yes Yes    QuickVue Influenza test      Negative  Negative    Group A Strep Ag      Negative Negative     TSH      0.36 - 3.74 uIU/mL      T4, Free      0.7 - 1.5 NG/DL        Component      Latest Ref Rng & Units 10/19/2017 10/19/2017 10/19/2017           7:46 AM  7:45 AM  7:45 AM   Sodium      136 - 145 mmol/L      Potassium      3.5 - 5.5 mmol/L      Chloride      100 - 108 mmol/L      CO2      21 - 32 mmol/L      Anion gap      3.0 - 18 mmol/L      Glucose      74 - 99 mg/dL      BUN      7.0 - 18 MG/DL      Creatinine      0.6 - 1.3 MG/DL      BUN/Creatinine ratio      12 - 20        GFR est AA      >60 ml/min/1.73m2      GFR est non-AA      >60 ml/min/1.73m2      Calcium      8.5 - 10.1 MG/DL      Bilirubin, total      0.2 - 1.0 MG/DL      ALT (SGPT)      13 - 56 U/L      AST      15 - 37 U/L      Alk. phosphatase      45 - 117 U/L      Protein, total      6.4 - 8.2 g/dL      Albumin      3.4 - 5.0 g/dL      Globulin      2.0 - 4.0 g/dL      A-G Ratio      0.8 - 1.7        Cholesterol, total      <200 MG/DL      Triglyceride      <150 MG/DL      HDL Cholesterol      40 - 60 MG/DL      LDL, calculated      0 - 100 MG/DL      VLDL, calculated      MG/DL      CHOL/HDL Ratio      0 - 5.0        Microalbumin,urine random      0 - 3.0 MG/DL      Creatinine, urine      30 - 125 mg/dL      Microalbumin/Creat.  Ratio      0 - 30 mg/g      Hemoglobin A1c, (calculated)      4.2 - 5.6 % 8.2 (H)     Est. average glucose      mg/dL 189     VALID INTERNAL CONTROL POC            QuickVue Influenza test      Negative      Group A Strep Ag      Negative      TSH      0.36 - 3.74 uIU/mL   1.77 T4, Free      0.7 - 1.5 NG/DL  1.5      Component      Latest Ref Rng & Units 10/19/2017 10/19/2017           7:45 AM  7:45 AM   Sodium      136 - 145 mmol/L  136   Potassium      3.5 - 5.5 mmol/L  4.1   Chloride      100 - 108 mmol/L  99 (L)   CO2      21 - 32 mmol/L  28   Anion gap      3.0 - 18 mmol/L  9   Glucose      74 - 99 mg/dL  158 (H)   BUN      7.0 - 18 MG/DL  11   Creatinine      0.6 - 1.3 MG/DL  0.68   BUN/Creatinine ratio      12 - 20    16   GFR est AA      >60 ml/min/1.73m2  >60   GFR est non-AA      >60 ml/min/1.73m2  >60   Calcium      8.5 - 10.1 MG/DL  8.5   Bilirubin, total      0.2 - 1.0 MG/DL  0.3   ALT (SGPT)      13 - 56 U/L  21   AST      15 - 37 U/L  14 (L)   Alk. phosphatase      45 - 117 U/L  76   Protein, total      6.4 - 8.2 g/dL  7.1   Albumin      3.4 - 5.0 g/dL  3.7   Globulin      2.0 - 4.0 g/dL  3.4   A-G Ratio      0.8 - 1.7    1.1   Cholesterol, total      <200 MG/ (H)    Triglyceride      <150 MG/ (H)    HDL Cholesterol      40 - 60 MG/DL 41    LDL, calculated      0 - 100 MG/.4 (H)    VLDL, calculated      MG/DL 59.6    CHOL/HDL Ratio      0 - 5.0   5.6 (H)    Microalbumin,urine random      0 - 3.0 MG/DL     Creatinine, urine      30 - 125 mg/dL     Microalbumin/Creat. Ratio      0 - 30 mg/g     Hemoglobin A1c, (calculated)      4.2 - 5.6 %     Est. average glucose      mg/dL     VALID INTERNAL CONTROL POC           QuickVue Influenza test      Negative     Group A Strep Ag      Negative     TSH      0.36 - 3.74 uIU/mL     T4, Free      0.7 - 1.5 NG/DL       TESTS      Assessment/Plan:    Diabetes - stable  Thyroid stable  Insomnia stable  Uri mild no tx needed    Lab review: labs are reviewed, up to date and normal    Diagnoses and all orders for this visit:    1. Sore throat  -     AMB POC RAPID INFLUENZA TEST  -     AMB POC RAPID STREP A    2. Congenital hypothyroidism without goiter  -     sertraline (ZOLOFT) 25 mg tablet; Take 1 Tab by mouth daily.   - levothyroxine (SYNTHROID) 200 mcg tablet; Take 1 Tab by mouth Daily (before breakfast). -     zolpidem (AMBIEN) 10 mg tablet; Take 1 Tab by mouth nightly as needed. -     metFORMIN (GLUCOPHAGE) 500 mg tablet; Take 1 Tab by mouth two (2) times daily (with meals). -     glucose blood VI test strips (BLOOD GLUCOSE TEST) strip; Use 4 per week  -     Lancets misc; Use 4 times a week    3. Primary insomnia  -     sertraline (ZOLOFT) 25 mg tablet; Take 1 Tab by mouth daily. -     levothyroxine (SYNTHROID) 200 mcg tablet; Take 1 Tab by mouth Daily (before breakfast). -     zolpidem (AMBIEN) 10 mg tablet; Take 1 Tab by mouth nightly as needed. -     metFORMIN (GLUCOPHAGE) 500 mg tablet; Take 1 Tab by mouth two (2) times daily (with meals). -     glucose blood VI test strips (BLOOD GLUCOSE TEST) strip; Use 4 per week  -     Lancets misc; Use 4 times a week    4. Controlled type 2 diabetes mellitus with diabetic nephropathy, without long-term current use of insulin (HCC)  -     sertraline (ZOLOFT) 25 mg tablet; Take 1 Tab by mouth daily. -     levothyroxine (SYNTHROID) 200 mcg tablet; Take 1 Tab by mouth Daily (before breakfast). -     zolpidem (AMBIEN) 10 mg tablet; Take 1 Tab by mouth nightly as needed. -     metFORMIN (GLUCOPHAGE) 500 mg tablet; Take 1 Tab by mouth two (2) times daily (with meals). -     glucose blood VI test strips (BLOOD GLUCOSE TEST) strip; Use 4 per week  -     Lancets misc; Use 4 times a week    Other orders  -     lancets (FREESTYLE LANCETS) 28 gauge misc; USE 4 TIMES A WEEK          I have discussed the diagnosis with the patient and the intended plan as seen in the above orders. The patient has received an after-visit summary and questions were answered concerning future plans. I have discussed medication side effects and warnings with the patient as well. I have reviewed the plan of care with the patient, accepted their input and they are in agreement with the treatment goals. Follow-up Disposition: Not on File

## 2017-10-25 NOTE — MR AVS SNAPSHOT
Visit Information Date & Time Provider Department Dept. Phone Encounter #  
 10/25/2017 12:30 PM Azael Cornejo [phone] [id number] Follow-up Instructions Return in about 6 months (around 4/25/2018) for physical, labs prior, mammo prior, dexa prior, EKG next visit. Upcoming Health Maintenance Date Due  
 EYE EXAM RETINAL OR DILATED Q1 3/15/2012 FOBT Q 1 YEAR AGE 50-75 4/4/2016 INFLUENZA AGE 9 TO ADULT 8/1/2017 FOOT EXAM Q1 11/3/2017 HEMOGLOBIN A1C Q6M 4/19/2018 MICROALBUMIN Q1 10/19/2018 LIPID PANEL Q1 10/19/2018 BREAST CANCER SCRN MAMMOGRAM 7/10/2019 DTaP/Tdap/Td series (2 - Td) 5/16/2026 Allergies as of 10/25/2017  Review Complete On: 4/24/2017 By: Osvaldo Dunn., DO No Known Allergies Current Immunizations  Reviewed on 9/23/2015 Name Date Influenza Vaccine 10/9/2014, 12/5/2013  8:30 AM, 12/6/2012 Influenza Vaccine Roselynn Dougie) 9/23/2015 Influenza Vaccine (Quad) PF 11/3/2016 Influenza Vaccine Split 10/4/2011 Pneumococcal Conjugate (PCV-13) 9/23/2015 Pneumococcal Polysaccharide (PPSV-23) 11/3/2016 TD Vaccine 9/16/2007 Tdap 5/16/2016 Not reviewed this visit You Were Diagnosed With   
  
 Codes Comments Sore throat    -  Primary ICD-10-CM: J02.9 ICD-9-CM: 446 Congenital hypothyroidism without goiter     ICD-10-CM: E03.1 ICD-9-CM: 331 Primary insomnia     ICD-10-CM: F51.01 
ICD-9-CM: 307.42 Controlled type 2 diabetes mellitus with diabetic nephropathy, without long-term current use of insulin (HCC)     ICD-10-CM: E11.21 
ICD-9-CM: 250.40, 583.81 Vitals BP Pulse Temp Resp Height(growth percentile) Weight(growth percentile) 124/84 (BP 1 Location: Right arm, BP Patient Position: Sitting) 82 97.9 °F (36.6 °C) (Oral) 16 5' 5\" (1.651 m) 202 lb 12.8 oz (92 kg) SpO2 BMI OB Status 99% 33.75 kg/m2 Hysterectomy BMI and BSA Data Body Mass Index Body Surface Area 33.75 kg/m 2 2.05 m 2 Preferred Pharmacy Pharmacy Name [address] [phone] Your Updated Medication List  
  
   
This list is accurate as of: 10/25/17  1:20 PM.  Always use your most recent med list.  
  
  
  
  
 Blood-Glucose Meter monitoring kit Test sugar twice a day twice a week  
  
 glucose blood VI test strips strip Commonly known as:  blood glucose test  
Use 4 per week * lancets 28 gauge Misc Commonly known as:  FREESTYLE LANCETS  
USE 4 TIMES A WEEK * Lancets Misc Use 4 times a week  
  
 levothyroxine 200 mcg tablet Commonly known as:  SYNTHROID Take 1 Tab by mouth Daily (before breakfast). metFORMIN 500 mg tablet Commonly known as:  GLUCOPHAGE Take 1 Tab by mouth two (2) times daily (with meals). sertraline 25 mg tablet Commonly known as:  ZOLOFT Take 1 Tab by mouth daily. zolpidem 10 mg tablet Commonly known as:  AMBIEN Take 1 Tab by mouth nightly as needed. * Notice: This list has 2 medication(s) that are the same as other medications prescribed for you. Read the directions carefully, and ask your doctor or other care provider to review them with you. Prescriptions Printed Refills  
 zolpidem (AMBIEN) 10 mg tablet 1 Sig: Take 1 Tab by mouth nightly as needed. Class: Print Route: Oral  
  
Prescriptions Sent to Pharmacy Refills  
 lancets (FREESTYLE LANCETS) 28 gauge misc 10 Sig: USE 4 TIMES A WEEK Class: Normal  
 Pharmacy: [address], [address] Ph #: [phone]  
 sertraline (ZOLOFT) 25 mg tablet 1 Sig: Take 1 Tab by mouth daily. Class: Normal  
 Pharmacy: [address], [address] Ph #: [phone]  Route: Oral  
 levothyroxine (SYNTHROID) 200 mcg tablet 1  
 Sig: Take 1 Tab by mouth Daily (before breakfast). Class: Normal  
 Pharmacy: [address], [address] Ph #: [phone] Route: Oral  
 metFORMIN (GLUCOPHAGE) 500 mg tablet 4 Sig: Take 1 Tab by mouth two (2) times daily (with meals). Class: Normal  
 Pharmacy: [address], [address] Ph #: [phone] Route: Oral  
 glucose blood VI test strips (BLOOD GLUCOSE TEST) strip 11 Sig: Use 4 per week Class: Normal  
 Pharmacy: [address], [address] Ph #: [phone] Lancets misc 11 Sig: Use 4 times a week Class: Normal  
 Pharmacy: [address], [address] Ph #: [phone] We Performed the Following AMB POC RAPID INFLUENZA TEST [36957 CPT(R)] AMB POC RAPID STREP A [51246 CPT(R)] Follow-up Instructions Return in about 6 months (around 4/25/2018) for physical, labs prior, mammo prior, dexa prior, EKG next visit. To-Do List   
 Around 04/23/2018 Lab:  CBC WITH AUTOMATED DIFF   
  
 04/23/2018 Imaging:  DEXA BONE DENSITY STUDY AXIAL   
  
 04/23/2018 Lab:  HEMOGLOBIN A1C WITH EAG Around 04/23/2018 Lab:  LIPID PANEL   
  
 04/23/2018 Imaging:  TJ MAMMO BI SCREENING INCL CAD Around 04/23/2018 Lab:  METABOLIC PANEL, COMPREHENSIVE   
  
 04/23/2018 Lab:  MICROALBUMIN, UR, RAND W/ MICROALBUMIN/CREA RATIO Around 04/23/2018 Lab:  TSH 3RD GENERATION Around 04/23/2018 Lab:  URINALYSIS W/ RFLX MICROSCOPIC Introducing [facility]! Dear Jes Avila: Thank you for requesting a [employer] account. Our records indicate that you already have an active [employer] account. You can access your account anytime at [email or web address] Did you know that you can access your hospital and ER discharge instructions at any time in [employer]? You can also review all of your test results from your hospital stay or ER visit. Additional Information If you have questions, please visit the Frequently Asked Questions section of the [employer] website at [email or web address] Remember, [employer] is NOT to be used for urgent needs. For medical emergencies, dial 911. Now available from your iPhone and Android! Please provide this summary of care documentation to your next provider. Your primary care clinician is listed as [id number] [facility]. If you have any questions after today's visit, please call [phone].

## 2017-12-11 ENCOUNTER — HOSPITAL ENCOUNTER (OUTPATIENT)
Dept: GENERAL RADIOLOGY | Age: 52
Discharge: HOME | End: 2017-12-11
Attending: FAMILY MEDICINE
Payer: OTHER GOVERNMENT

## 2017-12-11 DIAGNOSIS — E03.1 CONGENITAL HYPOTHYROIDISM WITHOUT GOITER: ICD-10-CM

## 2017-12-11 DIAGNOSIS — F51.01 PRIMARY INSOMNIA: ICD-10-CM

## 2017-12-11 DIAGNOSIS — E11.21 CONTROLLED TYPE 2 DIABETES MELLITUS WITH DIABETIC NEPHROPATHY, WITHOUT LONG-TERM CURRENT USE OF INSULIN (HCC): ICD-10-CM

## 2017-12-11 DIAGNOSIS — Z13.820 SCREENING FOR OSTEOPOROSIS: ICD-10-CM

## 2017-12-11 DIAGNOSIS — J02.9 SORE THROAT: ICD-10-CM

## 2017-12-11 PROCEDURE — 77080 DXA BONE DENSITY AXIAL: CPT

## 2018-04-23 ENCOUNTER — HOSPITAL ENCOUNTER (OUTPATIENT)
Dept: LAB | Age: 53
Discharge: HOME | End: 2018-04-23
Payer: OTHER GOVERNMENT

## 2018-04-23 DIAGNOSIS — E03.1 CONGENITAL HYPOTHYROIDISM WITHOUT GOITER: ICD-10-CM

## 2018-04-23 DIAGNOSIS — F51.01 PRIMARY INSOMNIA: ICD-10-CM

## 2018-04-23 DIAGNOSIS — E11.21 CONTROLLED TYPE 2 DIABETES MELLITUS WITH DIABETIC NEPHROPATHY, WITHOUT LONG-TERM CURRENT USE OF INSULIN (HCC): ICD-10-CM

## 2018-04-23 DIAGNOSIS — J02.9 SORE THROAT: ICD-10-CM

## 2018-04-23 LAB
ALBUMIN SERPL-MCNC: 3.8 G/DL (ref 3.4–5)
ALBUMIN/GLOB SERPL: 1.1 {RATIO} (ref 0.8–1.7)
ALP SERPL-CCNC: 83 U/L (ref 45–117)
ALT SERPL-CCNC: 39 U/L (ref 13–56)
ANION GAP SERPL CALC-SCNC: 8 MMOL/L (ref 3–18)
APPEARANCE UR: CLEAR
AST SERPL-CCNC: 22 U/L (ref 15–37)
BASOPHILS # BLD: 0 K/UL (ref 0–0.06)
BASOPHILS NFR BLD: 0 % (ref 0–2)
BILIRUB SERPL-MCNC: 0.4 MG/DL (ref 0.2–1)
BILIRUB UR QL: NEGATIVE
BUN SERPL-MCNC: 15 MG/DL (ref 7–18)
BUN/CREAT SERPL: 23 (ref 12–20)
CALCIUM SERPL-MCNC: 8.7 MG/DL (ref 8.5–10.1)
CHLORIDE SERPL-SCNC: 101 MMOL/L (ref 100–108)
CHOLEST SERPL-MCNC: 208 MG/DL
CO2 SERPL-SCNC: 28 MMOL/L (ref 21–32)
COLOR UR: YELLOW
CREAT SERPL-MCNC: 0.66 MG/DL (ref 0.6–1.3)
CREAT UR-MCNC: 22.62 MG/DL (ref 30–125)
DIFFERENTIAL METHOD BLD: ABNORMAL
EOSINOPHIL # BLD: 0.1 K/UL (ref 0–0.4)
EOSINOPHIL NFR BLD: 1 % (ref 0–5)
ERYTHROCYTE [DISTWIDTH] IN BLOOD BY AUTOMATED COUNT: 13.4 % (ref 11.6–14.5)
EST. AVERAGE GLUCOSE BLD GHB EST-MCNC: 203 MG/DL
GLOBULIN SER CALC-MCNC: 3.5 G/DL (ref 2–4)
GLUCOSE SERPL-MCNC: 151 MG/DL (ref 74–99)
GLUCOSE UR STRIP.AUTO-MCNC: NEGATIVE MG/DL
HBA1C MFR BLD: 8.7 % (ref 4.2–5.6)
HCT VFR BLD AUTO: 40.9 % (ref 35–45)
HDLC SERPL-MCNC: 33 MG/DL (ref 40–60)
HDLC SERPL: 6.3 {RATIO} (ref 0–5)
HGB BLD-MCNC: 13.4 G/DL (ref 12–16)
HGB UR QL STRIP: NEGATIVE
KETONES UR QL STRIP.AUTO: NEGATIVE MG/DL
LDLC SERPL CALC-MCNC: 125 MG/DL (ref 0–100)
LEUKOCYTE ESTERASE UR QL STRIP.AUTO: NEGATIVE
LIPID PROFILE,FLP: ABNORMAL
LYMPHOCYTES # BLD: 1.9 K/UL (ref 0.9–3.6)
LYMPHOCYTES NFR BLD: 27 % (ref 21–52)
MCH RBC QN AUTO: 27.6 PG (ref 24–34)
MCHC RBC AUTO-ENTMCNC: 32.8 G/DL (ref 31–37)
MCV RBC AUTO: 84.3 FL (ref 74–97)
MICROALBUMIN UR-MCNC: 0.6 MG/DL (ref 0–3)
MICROALBUMIN/CREAT UR-RTO: 27 MG/G (ref 0–30)
MONOCYTES # BLD: 0.4 K/UL (ref 0.05–1.2)
MONOCYTES NFR BLD: 6 % (ref 3–10)
NEUTS SEG # BLD: 4.6 K/UL (ref 1.8–8)
NEUTS SEG NFR BLD: 66 % (ref 40–73)
NITRITE UR QL STRIP.AUTO: NEGATIVE
PH UR STRIP: 5.5 [PH] (ref 5–8)
PLATELET # BLD AUTO: 251 K/UL (ref 135–420)
PMV BLD AUTO: 8.7 FL (ref 9.2–11.8)
POTASSIUM SERPL-SCNC: 4.1 MMOL/L (ref 3.5–5.5)
PROT SERPL-MCNC: 7.3 G/DL (ref 6.4–8.2)
PROT UR STRIP-MCNC: NEGATIVE MG/DL
RBC # BLD AUTO: 4.85 M/UL (ref 4.2–5.3)
SODIUM SERPL-SCNC: 137 MMOL/L (ref 136–145)
SP GR UR REFRACTOMETRY: 1.01 (ref 1–1.03)
TRIGL SERPL-MCNC: 250 MG/DL (ref ?–150)
TSH SERPL DL<=0.05 MIU/L-ACNC: 1.76 UIU/ML (ref 0.36–3.74)
UROBILINOGEN UR QL STRIP.AUTO: 0.2 EU/DL (ref 0.2–1)
VLDLC SERPL CALC-MCNC: 50 MG/DL
WBC # BLD AUTO: 7 K/UL (ref 4.6–13.2)

## 2018-04-23 PROCEDURE — 80061 LIPID PANEL: CPT | Performed by: FAMILY MEDICINE

## 2018-04-23 PROCEDURE — 36415 COLL VENOUS BLD VENIPUNCTURE: CPT | Performed by: FAMILY MEDICINE

## 2018-04-23 PROCEDURE — 85025 COMPLETE CBC W/AUTO DIFF WBC: CPT | Performed by: FAMILY MEDICINE

## 2018-04-23 PROCEDURE — 83036 HEMOGLOBIN GLYCOSYLATED A1C: CPT | Performed by: FAMILY MEDICINE

## 2018-04-23 PROCEDURE — 80053 COMPREHEN METABOLIC PANEL: CPT | Performed by: FAMILY MEDICINE

## 2018-04-23 PROCEDURE — 84443 ASSAY THYROID STIM HORMONE: CPT | Performed by: FAMILY MEDICINE

## 2018-04-23 PROCEDURE — 82043 UR ALBUMIN QUANTITATIVE: CPT | Performed by: FAMILY MEDICINE

## 2018-04-23 PROCEDURE — 81003 URINALYSIS AUTO W/O SCOPE: CPT | Performed by: FAMILY MEDICINE

## 2018-05-15 ENCOUNTER — HOSPITAL ENCOUNTER (OUTPATIENT)
Dept: LAB | Age: 53
Discharge: HOME | End: 2018-05-15
Payer: OTHER GOVERNMENT

## 2018-05-15 PROCEDURE — 88331 PATH CONSLTJ SURG 1 BLK 1SPC: CPT | Performed by: PLASTIC SURGERY

## 2018-05-15 PROCEDURE — 88305 TISSUE EXAM BY PATHOLOGIST: CPT | Performed by: PLASTIC SURGERY

## 2018-05-15 PROCEDURE — 88304 TISSUE EXAM BY PATHOLOGIST: CPT | Performed by: PLASTIC SURGERY

## 2018-05-16 ENCOUNTER — OFFICE VISIT (OUTPATIENT)
Dept: FAMILY MEDICINE CLINIC | Age: 53
End: 2018-05-16

## 2018-05-16 VITALS
WEIGHT: 200.2 LBS | DIASTOLIC BLOOD PRESSURE: 86 MMHG | TEMPERATURE: 97.9 F | HEIGHT: 65 IN | OXYGEN SATURATION: 98 % | SYSTOLIC BLOOD PRESSURE: 130 MMHG | RESPIRATION RATE: 16 BRPM | HEART RATE: 92 BPM | BODY MASS INDEX: 33.36 KG/M2

## 2018-05-16 DIAGNOSIS — E03.1 CONGENITAL HYPOTHYROIDISM WITHOUT GOITER: ICD-10-CM

## 2018-05-16 DIAGNOSIS — Z00.00 ROUTINE GENERAL MEDICAL EXAMINATION AT A HEALTH CARE FACILITY: Primary | ICD-10-CM

## 2018-05-16 DIAGNOSIS — E11.8 CONTROLLED TYPE 2 DIABETES MELLITUS WITH COMPLICATION, WITHOUT LONG-TERM CURRENT USE OF INSULIN (HCC): ICD-10-CM

## 2018-05-16 DIAGNOSIS — F51.01 PRIMARY INSOMNIA: ICD-10-CM

## 2018-05-16 RX ORDER — ZOLPIDEM TARTRATE 10 MG/1
10 TABLET ORAL
Qty: 90 TAB | Refills: 1 | Status: SHIPPED | OUTPATIENT
Start: 2018-05-16 | End: 2018-08-21 | Stop reason: SDUPTHER

## 2018-05-16 RX ORDER — SERTRALINE HYDROCHLORIDE 25 MG/1
25 TABLET, FILM COATED ORAL DAILY
Qty: 90 TAB | Refills: 1 | Status: SHIPPED | OUTPATIENT
Start: 2018-05-16 | End: 2018-08-21

## 2018-05-16 RX ORDER — LEVOTHYROXINE SODIUM 200 UG/1
200 TABLET ORAL
Qty: 90 TAB | Refills: 1 | Status: SHIPPED | OUTPATIENT
Start: 2018-05-16 | End: 2018-09-20 | Stop reason: SDUPTHER

## 2018-05-16 RX ORDER — METFORMIN HYDROCHLORIDE 500 MG/1
500 TABLET ORAL 2 TIMES DAILY WITH MEALS
Qty: 180 TAB | Refills: 4 | Status: SHIPPED | OUTPATIENT
Start: 2018-05-16 | End: 2018-09-20 | Stop reason: SDUPTHER

## 2018-05-16 NOTE — PROGRESS NOTES
Christianne Gutierrez is a 47 y.o. female presents today for her complete physical exam.         Pt is in Room # 5        Learning Assessment (baseline): Completed  Depression Screening: Completed    1. Have you been to the ER, urgent care clinic since your last visit? Hospitalized since your last visit? No    2. Have you seen or consulted any other health care providers outside of the [address] [place] [address] since your last visit? Include any pap smears or colon screening. No     Health Maintenance reviewed - .

## 2018-05-16 NOTE — PROGRESS NOTES
Kaye Au is a 47 y.o.  female and presents for a preventive health care visit   Subjective:  Health Maintenance History  Immunizations reviewed, shingles  indicated. Mammogram : utd , dexascan: utd ,pap smear : na ,   colonoscopy: referred , Eye exam: has appt with opt,  Chest CT:,    HPI ;    Patient Active Problem List    Diagnosis Date Noted    Congenital hypothyroidism without goiter 10/22/2015    Diabetes mellitus type 2, controlled ([facility] [address] 09/23/2015    Insomnia 06/30/2011     Current Outpatient Prescriptions   Medication Sig Dispense Refill    sertraline (ZOLOFT) 25 mg tablet Take 1 Tab by mouth daily. 90 Tab 1    levothyroxine (SYNTHROID) 200 mcg tablet Take 1 Tab by mouth Daily (before breakfast). 90 Tab 1    zolpidem (AMBIEN) 10 mg tablet Take 1 Tab by mouth nightly as needed. 90 Tab 1    metFORMIN (GLUCOPHAGE) 500 mg tablet Take 1 Tab by mouth two (2) times daily (with meals).  180 Tab 4     No Known Allergies  Past Medical History:   Diagnosis Date    Hypothyroid 9/16/2010    Insomnia 6/30/2011    Thyroid disease      Past Surgical History:   Procedure Laterality Date    HX GYN      hysterectomy     Family History   Problem Relation Age of Onset    Breast Cancer Sister 48    Cancer Father      Social History   Substance Use Topics    Smoking status: Never Smoker    Smokeless tobacco: Never Used    Alcohol use Yes       ROS       General: negative for - chills, fatigue, fever, weight change  Psych: negative for - anxiety, depression, irritability or mood swings  ENT: negative for - headaches, hearing change, nasal congestion, oral lesions, sneezing or sore throat  Heme/ Lymph: negative for - bleeding problems, bruising, pallor or swollen lymph nodes  Endo: negative for - hot flashes, polydipsia/polyuria or temperature intolerance  Resp: negative for - cough, shortness of breath or wheezing  CV: negative for - chest pain, edema or palpitations  GI: negative for - abdominal pain, change in bowel habits, constipation, diarrhea or nausea/vomiting  : negative for - dysuria, hematuria, incontinence, pelvic pain or vulvar/vaginal symptoms  MSK: negative for - joint pain, joint swelling or muscle pain  Neuro: negative for - confusion, headaches, seizures or weakness  Derm: negative for - dry skin, hair changes, rash or skin lesion changes      Objective:  Vitals:    05/16/18 0822   BP: 130/86   Pulse: 92   Resp: 16   Temp: 97.9 °F (36.6 °C)   TempSrc: Oral   SpO2: 98%   Weight: 200 lb 3.2 oz (90.8 kg)   Height: 5' 5\" (1.651 m)   PainSc:   0 - No pain       alert, well appearing, and in no distress, oriented to person, place, and time and normal appearing weight  Mental status - alert, oriented to person, place, and time  Eyes - pupils equal and reactive, extraocular eye movements intact  Ears - bilateral TM's and external ear canals normal  Nose - normal and patent, no erythema, discharge or polyps  Mouth - mucous membranes moist, pharynx normal without lesions  Neck - supple, no significant adenopathy  Lymphatics - no palpable lymphadenopathy, no hepatosplenomegaly  Chest - clear to auscultation, no wheezes, rales or rhonchi, symmetric air entry  Heart - normal rate, regular rhythm, normal S1, S2, no murmurs, rubs, clicks or gallops  Abdomen - soft, nontender, nondistended, no masses or organomegaly  Breasts - breasts appear normal, no suspicious masses, no skin or nipple changes or axillary nodes  Back exam - full range of motion, no tenderness, palpable spasm or pain on motion  Neurological - alert, oriented, normal speech, no focal findings or movement disorder noted  Musculoskeletal - no joint tenderness, deformity or swelling  Extremities - peripheral pulses normal, no pedal edema, no clubbing or cyanosis  Skin - normal coloration and turgor, no rashes, no suspicious skin lesions noted        LABS  Component      Latest Ref Rng & Units 4/23/2018 4/23/2018 4/23/2018 4/23/2018 8:20 AM  8:20 AM  8:20 AM  8:19 AM   WBC      4.6 - 13.2 K/uL       RBC      4.20 - 5.30 M/uL       HGB      12.0 - 16.0 g/dL       HCT      35.0 - 45.0 %       MCV      74.0 - 97.0 FL       MCH      24.0 - 34.0 PG       MCHC      31.0 - 37.0 g/dL       RDW      11.6 - 14.5 %       PLATELET      016 - 007 K/uL       MPV      9.2 - 11.8 FL       NEUTROPHILS      40 - 73 %       LYMPHOCYTES      21 - 52 %       MONOCYTES      3 - 10 %       EOSINOPHILS      0 - 5 %       BASOPHILS      0 - 2 %       ABS. NEUTROPHILS      1.8 - 8.0 K/UL       ABS. LYMPHOCYTES      0.9 - 3.6 K/UL       ABS. MONOCYTES      0.05 - 1.2 K/UL       ABS. EOSINOPHILS      0.0 - 0.4 K/UL       ABS. BASOPHILS      0.0 - 0.06 K/UL       DF             Sodium      136 - 145 mmol/L       Potassium      3.5 - 5.5 mmol/L       Chloride      100 - 108 mmol/L       CO2      21 - 32 mmol/L       Anion gap      3.0 - 18 mmol/L       Glucose      74 - 99 mg/dL       BUN      7.0 - 18 MG/DL       Creatinine      0.6 - 1.3 MG/DL       BUN/Creatinine ratio      12 - 20         GFR est AA      >60 ml/min/1.73m2       GFR est non-AA      >60 ml/min/1.73m2       Calcium      8.5 - 10.1 MG/DL       Bilirubin, total      0.2 - 1.0 MG/DL       ALT (SGPT)      13 - 56 U/L       AST      15 - 37 U/L       Alk.  phosphatase      45 - 117 U/L       Protein, total      6.4 - 8.2 g/dL       Albumin      3.4 - 5.0 g/dL       Globulin      2.0 - 4.0 g/dL       A-G Ratio      0.8 - 1.7         Color        YELLOW     Appearance        CLEAR     Specific gravity      1.005 - 1.030    1.007     pH (UA)      5.0 - 8.0    5.5     Protein      NEG mg/dL  NEGATIVE     Glucose      NEG mg/dL  NEGATIVE     Ketone      NEG mg/dL  NEGATIVE     Bilirubin      NEG    NEGATIVE     Blood      NEG    NEGATIVE     Urobilinogen      0.2 - 1.0 EU/dL  0.2     Nitrites      NEG    NEGATIVE     Leukocyte Esterase      NEG    NEGATIVE     Cholesterol, total      <200 MG/DL       Triglyceride <150 MG/DL       HDL Cholesterol      40 - 60 MG/DL       LDL, calculated      0 - 100 MG/DL       VLDL, calculated      MG/DL       CHOL/HDL Ratio      0 - 5.0         Microalbumin,urine random      0 - 3.0 MG/DL 0.60      Creatinine, urine      30 - 125 mg/dL 22.62 (L)      Microalbumin/Creat. Ratio      0 - 30 mg/g 27      Hemoglobin A1c, (calculated)      4.2 - 5.6 %   8.7 (H)    Est. average glucose      mg/dL   203    TSH      0.36 - 3.74 uIU/mL    1.76     Component      Latest Ref Rng & Units 4/23/2018 4/23/2018 4/23/2018           8:19 AM  8:19 AM  8:19 AM   WBC      4.6 - 13.2 K/uL   7.0   RBC      4.20 - 5.30 M/uL   4.85   HGB      12.0 - 16.0 g/dL   13.4   HCT      35.0 - 45.0 %   40.9   MCV      74.0 - 97.0 FL   84.3   MCH      24.0 - 34.0 PG   27.6   MCHC      31.0 - 37.0 g/dL   32.8   RDW      11.6 - 14.5 %   13.4   PLATELET      078 - 177 K/uL   251   MPV      9.2 - 11.8 FL   8.7 (L)   NEUTROPHILS      40 - 73 %   66   LYMPHOCYTES      21 - 52 %   27   MONOCYTES      3 - 10 %   6   EOSINOPHILS      0 - 5 %   1   BASOPHILS      0 - 2 %   0   ABS. NEUTROPHILS      1.8 - 8.0 K/UL   4.6   ABS. LYMPHOCYTES      0.9 - 3.6 K/UL   1.9   ABS. MONOCYTES      0.05 - 1.2 K/UL   0.4   ABS. EOSINOPHILS      0.0 - 0.4 K/UL   0.1   ABS. BASOPHILS      0.0 - 0.06 K/UL   0.0   DF         AUTOMATED   Sodium      136 - 145 mmol/L  137    Potassium      3.5 - 5.5 mmol/L  4.1    Chloride      100 - 108 mmol/L  101    CO2      21 - 32 mmol/L  28    Anion gap      3.0 - 18 mmol/L  8    Glucose      74 - 99 mg/dL  151 (H)    BUN      7.0 - 18 MG/DL  15    Creatinine      0.6 - 1.3 MG/DL  0.66    BUN/Creatinine ratio      12 - 20    23 (H)    GFR est AA      >60 ml/min/1.73m2  >60    GFR est non-AA      >60 ml/min/1.73m2  >60    Calcium      8.5 - 10.1 MG/DL  8.7    Bilirubin, total      0.2 - 1.0 MG/DL  0.4    ALT (SGPT)      13 - 56 U/L  39    AST      15 - 37 U/L  22    Alk.  phosphatase      45 - 117 U/L  83    Protein, total      6.4 - 8.2 g/dL  7.3    Albumin      3.4 - 5.0 g/dL  3.8    Globulin      2.0 - 4.0 g/dL  3.5    A-G Ratio      0.8 - 1.7    1.1    Color            Appearance            Specific gravity      1.005 - 1.030        pH (UA)      5.0 - 8.0        Protein      NEG mg/dL      Glucose      NEG mg/dL      Ketone      NEG mg/dL      Bilirubin      NEG        Blood      NEG        Urobilinogen      0.2 - 1.0 EU/dL      Nitrites      NEG        Leukocyte Esterase      NEG        Cholesterol, total      <200 MG/ (H)     Triglyceride      <150 MG/ (H)     HDL Cholesterol      40 - 60 MG/DL 33 (L)     LDL, calculated      0 - 100 MG/ (H)     VLDL, calculated      MG/DL 50     CHOL/HDL Ratio      0 - 5.0   6.3 (H)     Microalbumin,urine random      0 - 3.0 MG/DL      Creatinine, urine      30 - 125 mg/dL      Microalbumin/Creat. Ratio      0 - 30 mg/g      Hemoglobin A1c, (calculated)      4.2 - 5.6 %      Est. average glucose      mg/dL      TSH      0.36 - 3.74 uIU/mL        TESTS    ekg  nsr            Assessment/Plan:    Health Maintenance up to date. Recommend f/u physical 1 year. Routine screening labs/tests recommended prior to next physical.              I have discussed the diagnosis with the patient and the intended plan as seen in the above orders. The patient has received an after-visit summary and questions were answered concerning future plans. I have discussed medication side effects and warnings with the patient as well. I have reviewed the plan of care with the patient, accepted their input and they are in agreement with the treatment goals.          Follow-up Disposition:  Return in about 6 months (around 11/16/2018) for [employer] next visit.        -------------------------------------------------------------------------------------------------------------------    Problem Assessment  and also with   Chief Complaint   Patient presents with    Diabetes    Thyroid Problem    Insomnia    Depression         HPI ;  Diabetes Mellitus:  She has diabetes mellitus, and  diabetes. Diabetic ROS - diabetic diet compliance: compliant most of the time. Lab review: labs are reviewed, up to date and normal.   Thyroid Review:  Patient is seen for followup of hypothyroidism. Thyroid ROS: denies fatigue, weight changes, heat/cold intolerance, bowel/skin changes or CVS symptoms. Depression Review:  Patient is seen for followup of depression. Treatment includes Zoloft and no other therapies. Ongoing symptoms include depressed mood. She denies depressed mood. She experiences the following side effects from the treatment: none. Insomnia -- stable on meds    Additional Concerns:              Assessment/Plan:      Diabetes - stable  Insomnia stable  Thyroid stable  Depression stable      Diagnoses and all orders for this visit:    1. Routine general medical examination at a health care facility  -     AMB POC EKG ROUTINE W/ 12 LEADS, INTER & REP  -     sertraline (ZOLOFT) 25 mg tablet; Take 1 Tab by mouth daily. -     levothyroxine (SYNTHROID) 200 mcg tablet; Take 1 Tab by mouth Daily (before breakfast). -     zolpidem (AMBIEN) 10 mg tablet; Take 1 Tab by mouth nightly as needed. -     metFORMIN (GLUCOPHAGE) 500 mg tablet; Take 1 Tab by mouth two (2) times daily (with meals). -     REFERRAL TO GASTROENTEROLOGY    2. Controlled type 2 diabetes mellitus with complication, without long-term current use of insulin ([facility])  -     AMB POC EKG ROUTINE W/ 12 LEADS, INTER & REP  -     sertraline (ZOLOFT) 25 mg tablet; Take 1 Tab by mouth daily. -     levothyroxine (SYNTHROID) 200 mcg tablet; Take 1 Tab by mouth Daily (before breakfast). -     zolpidem (AMBIEN) 10 mg tablet; Take 1 Tab by mouth nightly as needed. -     metFORMIN (GLUCOPHAGE) 500 mg tablet; Take 1 Tab by mouth two (2) times daily (with meals). -     REFERRAL TO GASTROENTEROLOGY    3.  Congenital hypothyroidism without goiter  -     AMB POC EKG ROUTINE W/ 12 LEADS, INTER & REP  -     sertraline (ZOLOFT) 25 mg tablet; Take 1 Tab by mouth daily. -     levothyroxine (SYNTHROID) 200 mcg tablet; Take 1 Tab by mouth Daily (before breakfast). -     zolpidem (AMBIEN) 10 mg tablet; Take 1 Tab by mouth nightly as needed. -     metFORMIN (GLUCOPHAGE) 500 mg tablet; Take 1 Tab by mouth two (2) times daily (with meals). -     REFERRAL TO GASTROENTEROLOGY    4. Primary insomnia  -     AMB POC EKG ROUTINE W/ 12 LEADS, INTER & REP  -     sertraline (ZOLOFT) 25 mg tablet; Take 1 Tab by mouth daily. -     levothyroxine (SYNTHROID) 200 mcg tablet; Take 1 Tab by mouth Daily (before breakfast). -     zolpidem (AMBIEN) 10 mg tablet; Take 1 Tab by mouth nightly as needed. -     metFORMIN (GLUCOPHAGE) 500 mg tablet; Take 1 Tab by mouth two (2) times daily (with meals).   -     REFERRAL TO GASTROENTEROLOGY          Lab review: labs are reviewed, up to date and normal

## 2018-05-16 NOTE — MR AVS SNAPSHOT
[address] [address] [phone] Patient: Nury Moreira MRN: [id number] Visit Information Date & Time Provider Department Dept. Phone Encounter #  
 5/16/2018  8:00 AM Azael Cornejo [phone] [id number] Follow-up Instructions Return in about 6 months (around 11/16/2018) for EOV, HGAIC next visit. Follow-up and Disposition History Upcoming Health Maintenance Date Due  
 EYE EXAM RETINAL OR DILATED Q1 3/15/2012 FOBT Q 1 YEAR AGE 50-75 4/4/2016 FOOT EXAM Q1 11/3/2017 Influenza Age 5 to Adult 8/1/2018 HEMOGLOBIN A1C Q6M 10/23/2018 MICROALBUMIN Q1 4/23/2019 LIPID PANEL Q1 4/23/2019 BREAST CANCER SCRN MAMMOGRAM 7/10/2019 DTaP/Tdap/Td series (2 - Td) 5/16/2026 Allergies as of 5/16/2018  Review Complete On: 5/16/2018 By: Eleni Nelson., DO No Known Allergies Current Immunizations  Reviewed on 9/23/2015 Name Date Influenza Vaccine 10/9/2014, 12/5/2013  8:30 AM, 12/6/2012 Influenza Vaccine Candiss Beatrice) 9/23/2015 Influenza Vaccine (Quad) PF 11/3/2016 Influenza Vaccine Split 10/4/2011 Pneumococcal Conjugate (PCV-13) 9/23/2015 Pneumococcal Polysaccharide (PPSV-23) 11/3/2016 TD Vaccine 9/16/2007 Tdap 5/16/2016 Not reviewed this visit You Were Diagnosed With   
  
 Codes Comments Routine general medical examination at a health care facility    -  Primary ICD-10-CM: Z00.00 ICD-9-CM: V70.0 Controlled type 2 diabetes mellitus with complication, without long-term current use of insulin (HCC)     ICD-10-CM: E11.8 ICD-9-CM: 250.90 Congenital hypothyroidism without goiter     ICD-10-CM: E03.1 ICD-9-CM: 174 Primary insomnia     ICD-10-CM: F51.01 
ICD-9-CM: 307.42 Vitals BP Pulse Temp Resp Height(growth percentile) Weight(growth percentile) 130/86 (BP 1 Location: Right arm, BP Patient Position: Sitting) 92 97.9 °F (36.6 °C) (Oral) 16 5' 5\" (1.651 m) 200 lb 3.2 oz (90.8 kg) SpO2 BMI OB Status Smoking Status 98% 33.32 kg/m2 Hysterectomy Never Smoker BMI and BSA Data Body Mass Index Body Surface Area  
 33.32 kg/m 2 2.04 m 2 Preferred Pharmacy Pharmacy Name Phone [facility] [address] [phone] Your Updated Medication List  
  
   
This list is accurate as of 5/16/18  9:12 AM.  Always use your most recent med list.  
  
  
  
  
 levothyroxine 200 mcg tablet Commonly known as:  SYNTHROID Take 1 Tab by mouth Daily (before breakfast). metFORMIN 500 mg tablet Commonly known as:  GLUCOPHAGE Take 1 Tab by mouth two (2) times daily (with meals). sertraline 25 mg tablet Commonly known as:  ZOLOFT Take 1 Tab by mouth daily. zolpidem 10 mg tablet Commonly known as:  AMBIEN Take 1 Tab by mouth nightly as needed. Prescriptions Printed Refills  
 zolpidem (AMBIEN) 10 mg tablet 1 Sig: Take 1 Tab by mouth nightly as needed. Class: Print Route: Oral  
  
Prescriptions Sent to Pharmacy Refills  
 sertraline (ZOLOFT) 25 mg tablet 1 Sig: Take 1 Tab by mouth daily. Class: Normal  
 Pharmacy: [facility] [employer] [address] [place] [address], [address] [place] [address] Ph #: [phone] Route: Oral  
 levothyroxine (SYNTHROID) 200 mcg tablet 1 Sig: Take 1 Tab by mouth Daily (before breakfast). Class: Normal  
 Pharmacy: [facility] [employer] [address] [place] [address], [address] [place] [address] Ph #: [phone] Route: Oral  
 metFORMIN (GLUCOPHAGE) 500 mg tablet 4 Sig: Take 1 Tab by mouth two (2) times daily (with meals).   
 Class: Normal  
 Pharmacy: [facility] [address] [place] [address] #: [phone] Route: Oral  
  
We Performed the Following AMB POC EKG ROUTINE W/ 12 LEADS, INTER & REP [18672 CPT(R)] REFERRAL TO GASTROENTEROLOGY [FZN68 Custom] Comments:  
 Please evaluate patient for routine screening colnoscopy. 1st available is fine. Follow-up Instructions Return in about 6 months (around 11/16/2018) for EOV, HGAIC next visit. Referral Information Referral ID Referred By Referred To  
  
 [id number] Seth Minor MD [address]   
   [address] [place], [facility] Phone: [phone] Fax: [phone] Visits Status Start Date End Date 1 New Request 5/16/18 5/16/19 If your referral has a status of pending review or denied, additional information will be sent to support the outcome of this decision. Introducing [facility] & HEALTH SERVICES! Dear Saumya Oseguera: Thank you for requesting a [employer] account. Our records indicate that you already have an active [employer] account. You can access your account anytime at [email or web address] Did you know that you can access your hospital and ER discharge instructions at any time in [employer]? You can also review all of your test results from your hospital stay or ER visit. Additional Information If you have questions, please visit the Frequently Asked Questions section of the [employer] website at [email or web address] Remember, [employer] is NOT to be used for urgent needs. For medical emergencies, dial 911. Now available from your iPhone and Android! Please provide this summary of care documentation to your next provider. Your primary care clinician is listed as [address]. If you have any questions after today's visit, please call [phone].

## 2018-07-11 ENCOUNTER — HOSPITAL ENCOUNTER (OUTPATIENT)
Dept: MAMMOGRAPHY | Age: 53
Discharge: HOME | End: 2018-07-11
Attending: FAMILY MEDICINE
Payer: OTHER GOVERNMENT

## 2018-07-11 DIAGNOSIS — Z12.39 BREAST CANCER SCREENING: ICD-10-CM

## 2018-07-11 PROCEDURE — 77067 SCR MAMMO BI INCL CAD: CPT

## 2018-08-21 ENCOUNTER — HOSPITAL ENCOUNTER (OUTPATIENT)
Dept: LAB | Age: 53
Discharge: HOME | End: 2018-08-21
Payer: OTHER GOVERNMENT

## 2018-08-21 ENCOUNTER — OFFICE VISIT (OUTPATIENT)
Dept: FAMILY MEDICINE CLINIC | Age: 53
End: 2018-08-21

## 2018-08-21 VITALS
TEMPERATURE: 97.4 F | WEIGHT: 200.4 LBS | OXYGEN SATURATION: 97 % | DIASTOLIC BLOOD PRESSURE: 84 MMHG | BODY MASS INDEX: 33.39 KG/M2 | HEIGHT: 65 IN | RESPIRATION RATE: 20 BRPM | HEART RATE: 87 BPM | SYSTOLIC BLOOD PRESSURE: 118 MMHG

## 2018-08-21 DIAGNOSIS — E11.8 CONTROLLED TYPE 2 DIABETES MELLITUS WITH COMPLICATION, WITHOUT LONG-TERM CURRENT USE OF INSULIN (HCC): ICD-10-CM

## 2018-08-21 DIAGNOSIS — G47.00 INSOMNIA, UNSPECIFIED TYPE: ICD-10-CM

## 2018-08-21 DIAGNOSIS — E03.1 CONGENITAL HYPOTHYROIDISM WITHOUT GOITER: ICD-10-CM

## 2018-08-21 DIAGNOSIS — E03.1 CONGENITAL HYPOTHYROIDISM WITHOUT GOITER: Primary | ICD-10-CM

## 2018-08-21 DIAGNOSIS — F51.01 PRIMARY INSOMNIA: ICD-10-CM

## 2018-08-21 LAB
ALBUMIN SERPL-MCNC: 3.7 G/DL (ref 3.4–5)
ALBUMIN/GLOB SERPL: 1 {RATIO} (ref 0.8–1.7)
ALP SERPL-CCNC: 85 U/L (ref 45–117)
ALT SERPL-CCNC: 28 U/L (ref 13–56)
ANION GAP SERPL CALC-SCNC: 9 MMOL/L (ref 3–18)
AST SERPL-CCNC: 18 U/L (ref 15–37)
BILIRUB SERPL-MCNC: 0.3 MG/DL (ref 0.2–1)
BUN SERPL-MCNC: 15 MG/DL (ref 7–18)
BUN/CREAT SERPL: 19 (ref 12–20)
CALCIUM SERPL-MCNC: 9.2 MG/DL (ref 8.5–10.1)
CHLORIDE SERPL-SCNC: 102 MMOL/L (ref 100–108)
CO2 SERPL-SCNC: 25 MMOL/L (ref 21–32)
CREAT SERPL-MCNC: 0.77 MG/DL (ref 0.6–1.3)
GLOBULIN SER CALC-MCNC: 3.7 G/DL (ref 2–4)
GLUCOSE SERPL-MCNC: 315 MG/DL (ref 74–99)
POTASSIUM SERPL-SCNC: 3.9 MMOL/L (ref 3.5–5.5)
PROT SERPL-MCNC: 7.4 G/DL (ref 6.4–8.2)
SODIUM SERPL-SCNC: 136 MMOL/L (ref 136–145)
T4 FREE SERPL-MCNC: 1.3 NG/DL (ref 0.7–1.5)
TSH SERPL DL<=0.05 MIU/L-ACNC: 1.2 UIU/ML (ref 0.36–3.74)

## 2018-08-21 PROCEDURE — 84443 ASSAY THYROID STIM HORMONE: CPT | Performed by: FAMILY MEDICINE

## 2018-08-21 PROCEDURE — 80053 COMPREHEN METABOLIC PANEL: CPT | Performed by: FAMILY MEDICINE

## 2018-08-21 PROCEDURE — 84439 ASSAY OF FREE THYROXINE: CPT | Performed by: FAMILY MEDICINE

## 2018-08-21 PROCEDURE — 36415 COLL VENOUS BLD VENIPUNCTURE: CPT | Performed by: FAMILY MEDICINE

## 2018-08-21 RX ORDER — TRAZODONE HYDROCHLORIDE 50 MG/1
50 TABLET ORAL
Qty: 30 TAB | Refills: 1 | Status: SHIPPED | OUTPATIENT
Start: 2018-08-21 | End: 2018-09-20 | Stop reason: SDUPTHER

## 2018-08-21 RX ORDER — SERTRALINE HYDROCHLORIDE 50 MG/1
50 TABLET, FILM COATED ORAL
Qty: 90 TAB | Refills: 4 | Status: SHIPPED | OUTPATIENT
Start: 2018-08-21 | End: 2018-09-20 | Stop reason: SDUPTHER

## 2018-08-21 RX ORDER — ZOLPIDEM TARTRATE 10 MG/1
10 TABLET ORAL
Qty: 90 TAB | Refills: 1 | Status: SHIPPED | OUTPATIENT
Start: 2018-08-21 | End: 2018-09-20 | Stop reason: SDUPTHER

## 2018-08-21 NOTE — MR AVS SNAPSHOT
Brayden Devonte 
 
 
 [address] [facility] [address] [place] [address] [phone] 
[phone] Patient: Rosendo Li MRN: [id number] DIS:0/5/0152 Visit Information Date & Time Provider Department Dept. Phone Encounter #  
 8/21/2018  8:30 AM Michelle Mahan Brandiyoseph 6 [phone] [id number] Follow-up Instructions Return in about 4 weeks (around 9/18/2018) for rov, labs today. Follow-up and Disposition History Your Appointments 11/15/2018  9:00 AM  
[place] [address] [address] Appt Note: 6 months (around 11/16/2018) for EOV, HGAIC next visit [address] [facility] [place] [address] [facility] [address]  
  
   
 [address] Upcoming Health Maintenance Date Due  
 EYE EXAM RETINAL OR DILATED Q1 3/15/2012 FOBT Q 1 YEAR AGE 50-75 4/4/2016 FOOT EXAM Q1 11/3/2017 Influenza Age 5 to Adult 8/1/2018 MEDICARE YEARLY EXAM 8/17/2018 HEMOGLOBIN A1C Q6M 10/23/2018 MICROALBUMIN Q1 4/23/2019 LIPID PANEL Q1 4/23/2019 BREAST CANCER SCRN MAMMOGRAM 7/11/2020 DTaP/Tdap/Td series (2 - Td) 5/16/2026 Allergies as of 8/21/2018  Review Complete On: 8/21/2018 By: Michelle Mahan DO No Known Allergies Current Immunizations  Reviewed on 9/23/2015 Name Date Influenza Vaccine 10/9/2014, 12/5/2013  8:30 AM, 12/6/2012 Influenza Vaccine Cher Scott) 9/23/2015 Influenza Vaccine (Quad) PF 11/3/2016 Influenza Vaccine Split 10/4/2011 Pneumococcal Conjugate (PCV-13) 9/23/2015 Pneumococcal Polysaccharide (PPSV-23) 11/3/2016 TD Vaccine 9/16/2007 Tdap 5/16/2016 Not reviewed this visit You Were Diagnosed With   
  
 Codes Comments Congenital hypothyroidism without goiter    -  Primary ICD-10-CM: E03.1 ICD-9-CM: 646  Controlled type 2 diabetes mellitus with complication, without long-term current use of insulin ([facility].)     ICD-10-CM: E11.8 ICD-9-CM: 250.90 Insomnia, unspecified type     ICD-10-CM: G47.00 ICD-9-CM: 780.52 Primary insomnia     ICD-10-CM: F51.01 
ICD-9-CM: 307.42 Vitals BP Pulse Temp Resp Height(growth percentile) Weight(growth percentile) 118/84 (BP 1 Location: Right arm, BP Patient Position: Sitting) 87 97.4 °F (36.3 °C) (Oral) 20 5' 5\" (1.651 m) 200 lb 6.4 oz (90.9 kg) SpO2 BMI OB Status Smoking Status 97% 33.35 kg/m2 Hysterectomy Never Smoker BMI and BSA Data Body Mass Index Body Surface Area  
 33.35 kg/m 2 2.04 m 2 Preferred Pharmacy Pharmacy Name Phone Alea Kelly, [facility] [phone] Your Updated Medication List  
  
   
This list is accurate as of 8/21/18  8:33 AM.  Always use your most recent med list.  
  
  
  
  
 levothyroxine 200 mcg tablet Commonly known as:  SYNTHROID Take 1 Tab by mouth Daily (before breakfast). metFORMIN 500 mg tablet Commonly known as:  GLUCOPHAGE Take 1 Tab by mouth two (2) times daily (with meals). sertraline 50 mg tablet Commonly known as:  ZOLOFT Take 1 Tab by mouth nightly. traZODone 50 mg tablet Commonly known as:  Garcia Sowmya Take 1 Tab by mouth nightly. zolpidem 10 mg tablet Commonly known as:  AMBIEN Take 1 Tab by mouth nightly as needed. Prescriptions Printed Refills  
 zolpidem (AMBIEN) 10 mg tablet 1 Sig: Take 1 Tab by mouth nightly as needed. Class: Print Route: Oral  
 sertraline (ZOLOFT) 50 mg tablet 4 Sig: Take 1 Tab by mouth nightly. Class: Print Route: Oral  
 traZODone (DESYREL) 50 mg tablet 1 Sig: Take 1 Tab by mouth nightly. Class: Print Route: Oral  
  
Follow-up Instructions Return in about 4 weeks (around 9/18/2018) for rov, labs today. To-Do List   
 08/21/2018   Lab:  METABOLIC PANEL, COMPREHENSIVE   
  
 08/21/2018 Lab:  T4, FREE   
  
 08/21/2018 Lab:  TSH 3RD GENERATION Patient Instructions Insomnia: Care Instructions Your Care Instructions Insomnia is the inability to sleep well. It is a common problem for most people at some time. Insomnia may make it hard for you to get to sleep, stay asleep, or sleep as long as you need to. This can make you tired and grouchy during the day. It can also make you forgetful, less effective at work, and unhappy. Insomnia can be caused by conditions such as depression or anxiety. Pain can also affect your ability to sleep. When these problems are solved, the insomnia usually clears up. But sometimes bad sleep habits can cause insomnia. If insomnia is affecting your work or your enjoyment of life, you can take steps to improve your sleep. Follow-up care is a key part of your treatment and safety. Be sure to make and go to all appointments, and call your doctor if you are having problems. It's also a good idea to know your test results and keep a list of the medicines you take. How can you care for yourself at home? What to avoid · Do not have drinks with caffeine, such as coffee or black tea, for 8 hours before bed. · Do not smoke or use other types of tobacco near bedtime. Nicotine is a stimulant and can keep you awake. · Avoid drinking alcohol late in the evening, because it can cause you to wake in the middle of the night. · Do not eat a big meal close to bedtime. If you are hungry, eat a light snack. · Do not drink a lot of water close to bedtime, because the need to urinate may wake you up during the night. · Do not read or watch TV in bed. Use the bed only for sleeping and sexual activity. What to try · Go to bed at the same time every night, and wake up at the same time every morning. Do not take naps during the day. · Keep your bedroom quiet, dark, and cool. · Sleep on a comfortable pillow and mattress. · If watching the clock makes you anxious, turn it facing away from you so you cannot see the time. · If you worry when you lie down, start a worry book. Well before bedtime, write down your worries, and then set the book and your concerns aside. · Try meditation or other relaxation techniques before you go to bed. · If you cannot fall asleep, get up and go to another room until you feel sleepy. Do something relaxing. Repeat your bedtime routine before you go to bed again. · Make your house quiet and calm about an hour before bedtime. Turn down the lights, turn off the TV, log off the computer, and turn down the volume on music. This can help you relax after a busy day. When should you call for help? Watch closely for changes in your health, and be sure to contact your doctor if: 
  · Your efforts to improve your sleep do not work.  
  · Your insomnia gets worse.  
  · You have been feeling down, depressed, or hopeless or have lost interest in things that you usually enjoy. Where can you learn more? Go to http://alton-concepción.info/. Enter P513 in the search box to learn more about \"Insomnia: Care Instructions. \" Current as of: October 10, 2017 Content Version: 11.7 © 5182-6521 Healthwise, Incorporated. Care instructions adapted under license by [employer] (which disclaims liability or warranty for this information). If you have questions about a medical condition or this instruction, always ask your healthcare professional. [place] [address] any warranty or liability for your use of this information. Learning About Sleeping Well What does sleeping well mean? Sleeping well means getting enough sleep. How much sleep is enough varies among people. The number of hours you sleep is not as important as how you feel when you wake up. If you do not feel refreshed, you probably need more sleep. Another sign of not getting enough sleep is feeling tired during the day. The average total nightly sleep time is 7½ to 8 hours. Healthy adults may need a little more or a little less than this. Why is getting enough sleep important? Getting enough quality sleep is a basic part of good health. When your sleep suffers, your mood and your thoughts can suffer too. You may find yourself feeling more grumpy or stressed. Not getting enough sleep also can lead to serious problems, including injury, accidents, anxiety, and depression. What might cause poor sleeping? Many things can cause sleep problems, including: · Stress. Stress can be caused by fear about a single event, such as giving a speech. Or you may have ongoing stress, such as worry about work or school. · Depression, anxiety, and other mental or emotional conditions. · Changes in your sleep habits or surroundings. This includes changes that happen where you sleep, such as noise, light, or sleeping in a different bed. It also includes changes in your sleep pattern, such as having jet lag or working a late shift. · Health problems, such as pain, breathing problems, and restless legs syndrome. · Lack of regular exercise. How can you help yourself? Here are some tips that may help you sleep more soundly and wake up feeling more refreshed. Your sleeping area · Use your bedroom only for sleeping and sex. A bit of light reading may help you fall asleep. But if it doesn't, do your reading elsewhere in the house. Don't watch TV in bed. · Be sure your bed is big enough to stretch out comfortably, especially if you have a sleep partner. · Keep your bedroom quiet, dark, and cool. Use curtains, blinds, or a sleep mask to block out light. To block out noise, use earplugs, soothing music, or a \"white noise\" machine. Your evening and bedtime routine · Create a relaxing bedtime routine.  You might want to take a warm shower or bath, listen to soothing music, or drink a cup of noncaffeinated tea. · Go to bed at the same time every night. And get up at the same time every morning, even if you feel tired. What to avoid · Limit caffeine (coffee, tea, caffeinated sodas) during the day, and don't have any for at least 4 to 6 hours before bedtime. · Don't drink alcohol before bedtime. Alcohol can cause you to wake up more often during the night. · Don't smoke or use tobacco, especially in the evening. Nicotine can keep you awake. · Don't take naps during the day, especially close to bedtime. · Don't lie in bed awake for too long. If you can't fall asleep, or if you wake up in the middle of the night and can't get back to sleep within 15 minutes or so, get out of bed and go to another room until you feel sleepy. · Don't take medicine right before bed that may keep you awake or make you feel hyper or energized. Your doctor can tell you if your medicine may do this and if you can take it earlier in the day. If you can't sleep · Imagine yourself in a peaceful, pleasant scene. Focus on the details and feelings of being in a place that is relaxing. · Get up and do a quiet or boring activity until you feel sleepy. · Don't drink any liquids after 6 p.m. if you wake up often because you have to go to the bathroom. Where can you learn more? Go to http://alton-concepción.info/. Enter W996 in the search box to learn more about \"Learning About Sleeping Well. \" Current as of: December 7, 2017 Content Version: 11.7 © 4061-6398 Techcafe.io. Care instructions adapted under license by [employer] (which disclaims liability or warranty for this information). If you have questions about a medical condition or this instruction, always ask your healthcare professional. [place] [address] any warranty or liability for your use of this information. Patient Instructions History Introducing [facility]! Dear Germania Russo: Thank you for requesting a [employer] account. Our records indicate that you already have an active [employer] account. You can access your account anytime at [email or web address] Did you know that you can access your hospital and ER discharge instructions at any time in [employer]? You can also review all of your test results from your hospital stay or ER visit. Additional Information If you have questions, please visit the Frequently Asked Questions section of the [employer] website at [email or web address] Remember, [employer] Registry is NOT to be used for urgent needs. For medical emergencies, dial 911. Now available from your iPhone and Android! Please provide this summary of care documentation to your next provider. Your primary care clinician is listed as [address]. If you have any questions after today's visit, please call [phone].

## 2018-08-21 NOTE — PROGRESS NOTES
Room #      SUBJECTIVE:    Evangelist Clark is a 47 y.o. female who presents today for medication evaluation. Patient reports that her Leanord Fish medication is not working and her Tyroid medication has been recalled    1. Have you been to the ER, urgent care clinic since your last visit? Hospitalized since your last visit? NO    2. Have you seen or consulted any other health care providers outside of the [address] [place] [address] since your last visit? Include any pap smears or colon screening. NO  When :  Reason:    Health Maintenance reviewed Yes    Health Maintenance Due   Topic Date Due    EYE EXAM RETINAL OR DILATED Q1  03/15/2012    FOBT Q 1 YEAR AGE 50-75  04/04/2016    FOOT EXAM Q1  11/03/2017    Influenza Age 5 to Adult  08/01/2018    MEDICARE YEARLY EXAM  08/17/2018

## 2018-08-21 NOTE — PATIENT INSTRUCTIONS
Insomnia: Care Instructions  Your Care Instructions    Insomnia is the inability to sleep well. It is a common problem for most people at some time. Insomnia may make it hard for you to get to sleep, stay asleep, or sleep as long as you need to. This can make you tired and grouchy during the day. It can also make you forgetful, less effective at work, and unhappy. Insomnia can be caused by conditions such as depression or anxiety. Pain can also affect your ability to sleep. When these problems are solved, the insomnia usually clears up. But sometimes bad sleep habits can cause insomnia. If insomnia is affecting your work or your enjoyment of life, you can take steps to improve your sleep. Follow-up care is a key part of your treatment and safety. Be sure to make and go to all appointments, and call your doctor if you are having problems. It's also a good idea to know your test results and keep a list of the medicines you take. How can you care for yourself at home? What to avoid  · Do not have drinks with caffeine, such as coffee or black tea, for 8 hours before bed. · Do not smoke or use other types of tobacco near bedtime. Nicotine is a stimulant and can keep you awake. · Avoid drinking alcohol late in the evening, because it can cause you to wake in the middle of the night. · Do not eat a big meal close to bedtime. If you are hungry, eat a light snack. · Do not drink a lot of water close to bedtime, because the need to urinate may wake you up during the night. · Do not read or watch TV in bed. Use the bed only for sleeping and sexual activity. What to try  · Go to bed at the same time every night, and wake up at the same time every morning. Do not take naps during the day. · Keep your bedroom quiet, dark, and cool. · Sleep on a comfortable pillow and mattress. · If watching the clock makes you anxious, turn it facing away from you so you cannot see the time.   · If you worry when you lie down, start a worry book. Well before bedtime, write down your worries, and then set the book and your concerns aside. · Try meditation or other relaxation techniques before you go to bed. · If you cannot fall asleep, get up and go to another room until you feel sleepy. Do something relaxing. Repeat your bedtime routine before you go to bed again. · Make your house quiet and calm about an hour before bedtime. Turn down the lights, turn off the TV, log off the computer, and turn down the volume on music. This can help you relax after a busy day. When should you call for help? Watch closely for changes in your health, and be sure to contact your doctor if:    · Your efforts to improve your sleep do not work.     · Your insomnia gets worse.     · You have been feeling down, depressed, or hopeless or have lost interest in things that you usually enjoy. Where can you learn more? Go to http://altonHotlease.Comconcepción.info/. Enter P513 in the search box to learn more about \"Insomnia: Care Instructions. \"  Current as of: October 10, 2017  Content Version: 11.7  © 9608-9231 [employer]imado. Care instructions adapted under license by [employer] (which disclaims liability or warranty for this information). If you have questions about a medical condition or this instruction, always ask your healthcare professional. [address] any warranty or liability for your use of this information. Learning About Sleeping Well  What does sleeping well mean? Sleeping well means getting enough sleep. How much sleep is enough varies among people. The number of hours you sleep is not as important as how you feel when you wake up. If you do not feel refreshed, you probably need more sleep. Another sign of not getting enough sleep is feeling tired during the day. The average total nightly sleep time is 7½ to 8 hours.  Healthy adults may need a little more or a little less than this.  Why is getting enough sleep important? Getting enough quality sleep is a basic part of good health. When your sleep suffers, your mood and your thoughts can suffer too. You may find yourself feeling more grumpy or stressed. Not getting enough sleep also can lead to serious problems, including injury, accidents, anxiety, and depression. What might cause poor sleeping? Many things can cause sleep problems, including:  · Stress. Stress can be caused by fear about a single event, such as giving a speech. Or you may have ongoing stress, such as worry about work or school. · Depression, anxiety, and other mental or emotional conditions. · Changes in your sleep habits or surroundings. This includes changes that happen where you sleep, such as noise, light, or sleeping in a different bed. It also includes changes in your sleep pattern, such as having jet lag or working a late shift. · Health problems, such as pain, breathing problems, and restless legs syndrome. · Lack of regular exercise. How can you help yourself? Here are some tips that may help you sleep more soundly and wake up feeling more refreshed. Your sleeping area  · Use your bedroom only for sleeping and sex. A bit of light reading may help you fall asleep. But if it doesn't, do your reading elsewhere in the house. Don't watch TV in bed. · Be sure your bed is big enough to stretch out comfortably, especially if you have a sleep partner. · Keep your bedroom quiet, dark, and cool. Use curtains, blinds, or a sleep mask to block out light. To block out noise, use earplugs, soothing music, or a \"white noise\" machine. Your evening and bedtime routine  · Create a relaxing bedtime routine. You might want to take a warm shower or bath, listen to soothing music, or drink a cup of noncaffeinated tea. · Go to bed at the same time every night. And get up at the same time every morning, even if you feel tired.   What to avoid  · Limit caffeine (coffee, tea, caffeinated sodas) during the day, and don't have any for at least 4 to 6 hours before bedtime. · Don't drink alcohol before bedtime. Alcohol can cause you to wake up more often during the night. · Don't smoke or use tobacco, especially in the evening. Nicotine can keep you awake. · Don't take naps during the day, especially close to bedtime. · Don't lie in bed awake for too long. If you can't fall asleep, or if you wake up in the middle of the night and can't get back to sleep within 15 minutes or so, get out of bed and go to another room until you feel sleepy. · Don't take medicine right before bed that may keep you awake or make you feel hyper or energized. Your doctor can tell you if your medicine may do this and if you can take it earlier in the day. If you can't sleep  · Imagine yourself in a peaceful, pleasant scene. Focus on the details and feelings of being in a place that is relaxing. · Get up and do a quiet or boring activity until you feel sleepy. · Don't drink any liquids after 6 p.m. if you wake up often because you have to go to the bathroom. Where can you learn more? Go to http://alton-concepción.info/. Enter T969 in the search box to learn more about \"Learning About Sleeping Well. \"  Current as of: December 7, 2017  Content Version: 11.7  © 2347-0768 i3 membrane, [facility]. Care instructions adapted under license by [employer] (which disclaims liability or warranty for this information). If you have questions about a medical condition or this instruction, always ask your healthcare professional. [place] [address] any warranty or liability for your use of this information.

## 2018-08-21 NOTE — PROGRESS NOTES
Gayle Ospina is a 47 y.o.  female and presents with    Chief Complaint   Patient presents with    Thyroid Problem    Insomnia           Subjective: Thyroid Review:  Patient is seen for followup of hypothyroidism. Thyroid ROS: denies fatigue, weight changes, heat/cold intolerance, bowel/skin changes or CVS symptoms. Insomnia much worse - only sleeping 2 hours per night    Additional Concerns:          Patient Active Problem List    Diagnosis Date Noted    Congenital hypothyroidism without goiter 10/22/2015    Diabetes mellitus type 2, controlled ([facility] [address] 09/23/2015    Insomnia 06/30/2011     Current Outpatient Prescriptions   Medication Sig Dispense Refill    zolpidem (AMBIEN) 10 mg tablet Take 1 Tab by mouth nightly as needed. 90 Tab 1    sertraline (ZOLOFT) 50 mg tablet Take 1 Tab by mouth nightly. 90 Tab 4    traZODone (DESYREL) 50 mg tablet Take 1 Tab by mouth nightly. 30 Tab 1    levothyroxine (SYNTHROID) 200 mcg tablet Take 1 Tab by mouth Daily (before breakfast). 90 Tab 1    metFORMIN (GLUCOPHAGE) 500 mg tablet Take 1 Tab by mouth two (2) times daily (with meals). 180 Tab 4     No Known Allergies  Past Medical History:   Diagnosis Date    Hypothyroid 9/16/2010    Insomnia 6/30/2011    Menopause     Thyroid disease      Past Surgical History:   Procedure Laterality Date    HX GYN      hysterectomy    HX HYSTERECTOMY       Family History   Problem Relation Age of Onset    Breast Cancer Sister 48    Cancer Father      Social History   Substance Use Topics    Smoking status: Never Smoker    Smokeless tobacco: Never Used    Alcohol use Yes       ROS       All other systems reviewed and are negative.       Objective:  Vitals:    08/21/18 0802   BP: 118/84   Pulse: 87   Resp: 20   Temp: 97.4 °F (36.3 °C)   TempSrc: Oral   SpO2: 97%   Weight: 200 lb 6.4 oz (90.9 kg)   Height: 5' 5\" (1.651 m)   PainSc:   0 - No pain                 alert, well appearing, and in no distress and oriented to person, place, and time  Chest - clear to auscultation, no wheezes, rales or rhonchi, symmetric air entry  Heart - normal rate, regular rhythm, normal S1, S2, no murmurs, rubs, clicks or gallops  Abdomen - soft, nontender, nondistended, no masses or organomegaly        LABS     TESTS      Assessment/Plan:    insombnia  Try zoloft 50 + trazodone at hs in [facility] to [place] f/u  1 mo  Thyroid check levesl f/u if abnormal    Lab review: orders written for new lab studies as appropriate; see orders    Diagnoses and all orders for this visit:    1. Congenital hypothyroidism without goiter  -     zolpidem (AMBIEN) 10 mg tablet; Take 1 Tab by mouth nightly as needed. -     sertraline (ZOLOFT) 50 mg tablet; Take 1 Tab by mouth nightly. -     traZODone (DESYREL) 50 mg tablet; Take 1 Tab by mouth nightly.  -     TSH 3RD GENERATION; Future  -     T4, FREE; Future  -     METABOLIC PANEL, COMPREHENSIVE; Future    2. Controlled type 2 diabetes mellitus with complication, without long-term current use of insulin (HCC)  -     zolpidem (AMBIEN) 10 mg tablet; Take 1 Tab by mouth nightly as needed. -     sertraline (ZOLOFT) 50 mg tablet; Take 1 Tab by mouth nightly. -     traZODone (DESYREL) 50 mg tablet; Take 1 Tab by mouth nightly.  -     TSH 3RD GENERATION; Future  -     T4, FREE; Future  -     METABOLIC PANEL, COMPREHENSIVE; Future    3. Insomnia, unspecified type  -     zolpidem (AMBIEN) 10 mg tablet; Take 1 Tab by mouth nightly as needed. -     sertraline (ZOLOFT) 50 mg tablet; Take 1 Tab by mouth nightly. -     traZODone (DESYREL) 50 mg tablet; Take 1 Tab by mouth nightly.  -     TSH 3RD GENERATION; Future  -     T4, FREE; Future  -     METABOLIC PANEL, COMPREHENSIVE; Future    4. Primary insomnia  -     zolpidem (AMBIEN) 10 mg tablet; Take 1 Tab by mouth nightly as needed. -     sertraline (ZOLOFT) 50 mg tablet; Take 1 Tab by mouth nightly. -     traZODone (DESYREL) 50 mg tablet; Take 1 Tab by mouth nightly.   - TSH 3RD GENERATION; Future  -     T4, FREE; Future  -     METABOLIC PANEL, COMPREHENSIVE; Future          I have discussed the diagnosis with the patient and the intended plan as seen in the above orders. The patient has received an after-visit summary and questions were answered concerning future plans. I have discussed medication side effects and warnings with the patient as well. I have reviewed the plan of care with the patient, accepted their input and they are in agreement with the treatment goals. Follow-up Disposition:  Return in about 4 weeks (around 9/18/2018) for rov, labs today.

## 2018-08-22 ENCOUNTER — TELEPHONE (OUTPATIENT)
Dept: FAMILY MEDICINE CLINIC | Age: 53
End: 2018-08-22

## 2018-08-22 NOTE — TELEPHONE ENCOUNTER
Notify pt that labs show worsening diabetes. Needs to work on diet and exercise. F/u 1 mo for recheck.    Dr Vena Ganser

## 2018-08-22 NOTE — TELEPHONE ENCOUNTER
Spoke with Sharlene Seay, Verified 2 patient identifiers. Spoke with patient in regards to lab results. Relayed Doctor's notes.   Patient acknowledges understanding and voices no further questions or concerns at this time

## 2018-08-31 ENCOUNTER — TELEPHONE (OUTPATIENT)
Dept: FAMILY MEDICINE CLINIC | Age: 53
End: 2018-08-31

## 2018-08-31 NOTE — TELEPHONE ENCOUNTER
Called patient, identified self and office to inquire if she had scheduled an appointment to be seen at  [place] [address] with Dr. Lazaro Zeng to be seen, patient stated that she will contact office and schedule appointment and will contact office with update.  No other questions or concerns sending to provider

## 2018-09-20 ENCOUNTER — OFFICE VISIT (OUTPATIENT)
Dept: FAMILY MEDICINE CLINIC | Age: 53
End: 2018-09-20

## 2018-09-20 VITALS
WEIGHT: 197 LBS | RESPIRATION RATE: 19 BRPM | BODY MASS INDEX: 32.82 KG/M2 | OXYGEN SATURATION: 100 % | HEART RATE: 98 BPM | DIASTOLIC BLOOD PRESSURE: 82 MMHG | SYSTOLIC BLOOD PRESSURE: 136 MMHG | TEMPERATURE: 97.9 F | HEIGHT: 65 IN

## 2018-09-20 DIAGNOSIS — E03.1 CONGENITAL HYPOTHYROIDISM WITHOUT GOITER: ICD-10-CM

## 2018-09-20 DIAGNOSIS — G47.00 INSOMNIA, UNSPECIFIED TYPE: ICD-10-CM

## 2018-09-20 DIAGNOSIS — E11.8 CONTROLLED TYPE 2 DIABETES MELLITUS WITH COMPLICATION, WITHOUT LONG-TERM CURRENT USE OF INSULIN (HCC): Primary | ICD-10-CM

## 2018-09-20 DIAGNOSIS — F51.01 PRIMARY INSOMNIA: ICD-10-CM

## 2018-09-20 DIAGNOSIS — Z23 ENCOUNTER FOR IMMUNIZATION: ICD-10-CM

## 2018-09-20 LAB — HBA1C MFR BLD HPLC: 8.7 %

## 2018-09-20 RX ORDER — LEVOTHYROXINE SODIUM 200 UG/1
200 TABLET ORAL
Qty: 90 TAB | Refills: 1 | Status: SHIPPED | OUTPATIENT
Start: 2018-09-20 | End: 2019-04-16 | Stop reason: SDUPTHER

## 2018-09-20 RX ORDER — METFORMIN HYDROCHLORIDE 500 MG/1
500 TABLET ORAL 2 TIMES DAILY WITH MEALS
Qty: 180 TAB | Refills: 4 | Status: SHIPPED | OUTPATIENT
Start: 2018-09-20 | End: 2019-04-16

## 2018-09-20 RX ORDER — TRAZODONE HYDROCHLORIDE 50 MG/1
50 TABLET ORAL
Qty: 90 TAB | Refills: 4 | Status: SHIPPED | OUTPATIENT
Start: 2018-09-20 | End: 2019-04-16 | Stop reason: SDUPTHER

## 2018-09-20 RX ORDER — SERTRALINE HYDROCHLORIDE 50 MG/1
50 TABLET, FILM COATED ORAL
Qty: 90 TAB | Refills: 4 | Status: SHIPPED | OUTPATIENT
Start: 2018-09-20 | End: 2019-04-16 | Stop reason: SDUPTHER

## 2018-09-20 RX ORDER — ZOLPIDEM TARTRATE 10 MG/1
10 TABLET ORAL
Qty: 90 TAB | Refills: 1 | Status: SHIPPED | OUTPATIENT
Start: 2018-09-20 | End: 2019-04-16 | Stop reason: SDUPTHER

## 2018-09-20 NOTE — PROGRESS NOTES
Gilmar Zabala is a 47 y.o.  female and presents with Chief Complaint Patient presents with  Diabetes  Thyroid Problem  Insomnia Subjective: 
 
Diabetes Mellitus: 
She has diabetes mellitus, and  diabetes. Diabetic ROS - diabetic diet compliance: compliant most of the time. Lab review: labs are reviewed, up to date and normal.  
Thyroid Review: 
Patient is seen for followup of hypothyroidism. Thyroid ROS: denies fatigue, weight changes, heat/cold intolerance, bowel/skin changes or CVS symptoms. Insomnia improved on trazadone and ambien Additional Concerns:   
 
 
 
Patient Active Problem List  
 Diagnosis Date Noted  Congenital hypothyroidism without goiter 10/22/2015  Diabetes mellitus type 2, controlled ([facility] [address] 09/23/2015  Insomnia 06/30/2011 Current Outpatient Prescriptions Medication Sig Dispense Refill  traZODone (DESYREL) 50 mg tablet Take 1 Tab by mouth nightly. 90 Tab 4  
 sertraline (ZOLOFT) 50 mg tablet Take 1 Tab by mouth nightly. 90 Tab 4  
 zolpidem (AMBIEN) 10 mg tablet Take 1 Tab by mouth nightly as needed. 90 Tab 1  
 levothyroxine (SYNTHROID) 200 mcg tablet Take 1 Tab by mouth Daily (before breakfast). 90 Tab 1  
 metFORMIN (GLUCOPHAGE) 500 mg tablet Take 1 Tab by mouth two (2) times daily (with meals). 180 Tab 4 No Known Allergies Past Medical History:  
Diagnosis Date  Hypothyroid 9/16/2010  Insomnia 6/30/2011  Menopause  Thyroid disease Past Surgical History:  
Procedure Laterality Date  HX GYN    
 hysterectomy  HX HYSTERECTOMY Family History Problem Relation Age of Onset  Breast Cancer Sister 48  Cancer Father Social History Substance Use Topics  Smoking status: Never Smoker  Smokeless tobacco: Never Used  Alcohol use Yes ROS All other systems reviewed and are negative. Objective: 
Vitals:  
 09/20/18 1221 BP: 136/82 Pulse: 98 Resp: 19 Temp: 97.9 °F (36.6 °C) TempSrc: Oral  
SpO2: 100% Weight: 197 lb (89.4 kg) Height: 5' 5\" (1.651 m) PainSc:   0 - No pain  
 
 
 
 
 
 
 
alert, well appearing, and in no distress, oriented to person, place, and time and overweight Chest - clear to auscultation, no wheezes, rales or rhonchi, symmetric air entry Heart - normal rate, regular rhythm, normal S1, S2, no murmurs, rubs, clicks or gallops Diabetic foot exam:  
 
Left Foot: 
 Visual Exam: normal  
 Pulse DP: 2+ (normal) Filament test: normal sensation Vibratory sensation: normal 
   
Right Foot: 
 Visual Exam: normal  
 Pulse DP: 2+ (normal) Filament test: normal sensation Vibratory sensation: normal 
 
 
 
 
 
LABS Component Latest Ref Rng & Units 8/21/2018 8/21/2018 8/21/2018  
 
      8:56 AM  8:56 AM  8:56 AM  
Sodium 136 - 145 mmol/L 136 Potassium 3.5 - 5.5 mmol/L 3.9 Chloride 100 - 108 mmol/L 102 CO2 
    21 - 32 mmol/L 25 Anion gap 3.0 - 18 mmol/L 9 Glucose 74 - 99 mg/dL 315 (H) BUN 
    7.0 - 18 MG/DL 15 Creatinine 
    0.6 - 1.3 MG/DL 0.77 BUN/Creatinine ratio 12 - 20   19 GFR est AA 
    >60 ml/min/1.73m2 >60    
GFR est non-AA 
    >60 ml/min/1.73m2 >60 Calcium 8.5 - 10.1 MG/DL 9.2 Bilirubin, total 
    0.2 - 1.0 MG/DL 0.3 ALT (SGPT) 13 - 56 U/L 28 AST 
    15 - 37 U/L 18 Alk. phosphatase 45 - 117 U/L 85 Protein, total 
    6.4 - 8.2 g/dL 7.4 Albumin 3.4 - 5.0 g/dL 3.7 Globulin 2.0 - 4.0 g/dL 3.7 A-G Ratio 
    0.8 - 1.7   1.0 TSH 
    0.36 - 3.74 uIU/mL   1.20  
T4, Free 0.7 - 1.5 NG/DL  1.3   
 
aic  8.7 TESTS Assessment/Plan:   
Diabetes - stable Thyroid stable Insomnia stable Lab review: labs are reviewed, up to date and normal 
 
Diagnoses and all orders for this visit: 1.  Controlled type 2 diabetes mellitus with complication, without long-term current use of insulin (HCC) 
-      DIABETES EYE EXAM 
-     AMB POC HEMOGLOBIN A1C 
-      DIABETES FOOT EXAM 
-     traZODone (DESYREL) 50 mg tablet; Take 1 Tab by mouth nightly. -     sertraline (ZOLOFT) 50 mg tablet; Take 1 Tab by mouth nightly. -     zolpidem (AMBIEN) 10 mg tablet; Take 1 Tab by mouth nightly as needed. -     levothyroxine (SYNTHROID) 200 mcg tablet; Take 1 Tab by mouth Daily (before breakfast). -     metFORMIN (GLUCOPHAGE) 500 mg tablet; Take 1 Tab by mouth two (2) times daily (with meals). 2. Encounter for immunization -     Influenza virus vaccine (QUADRIVALENT PRES FREE SYRINGE) IM ([id number]) -     UT IMMUNIZ ADMIN,1 SINGLE/COMB VAC/TOXOID 3. Insomnia, unspecified type 
-     traZODone (DESYREL) 50 mg tablet; Take 1 Tab by mouth nightly. -     sertraline (ZOLOFT) 50 mg tablet; Take 1 Tab by mouth nightly. -     zolpidem (AMBIEN) 10 mg tablet; Take 1 Tab by mouth nightly as needed. 4. Congenital hypothyroidism without goiter 
-     traZODone (DESYREL) 50 mg tablet; Take 1 Tab by mouth nightly. -     sertraline (ZOLOFT) 50 mg tablet; Take 1 Tab by mouth nightly. -     zolpidem (AMBIEN) 10 mg tablet; Take 1 Tab by mouth nightly as needed. -     levothyroxine (SYNTHROID) 200 mcg tablet; Take 1 Tab by mouth Daily (before breakfast). -     metFORMIN (GLUCOPHAGE) 500 mg tablet; Take 1 Tab by mouth two (2) times daily (with meals). 5. Primary insomnia 
-     traZODone (DESYREL) 50 mg tablet; Take 1 Tab by mouth nightly. -     sertraline (ZOLOFT) 50 mg tablet; Take 1 Tab by mouth nightly. -     zolpidem (AMBIEN) 10 mg tablet; Take 1 Tab by mouth nightly as needed. -     levothyroxine (SYNTHROID) 200 mcg tablet; Take 1 Tab by mouth Daily (before breakfast). -     metFORMIN (GLUCOPHAGE) 500 mg tablet; Take 1 Tab by mouth two (2) times daily (with meals).  
 
 
 
 
I have discussed the diagnosis with the patient and the intended plan as seen in the above orders. The patient has received an after-visit summary and questions were answered concerning future plans. I have discussed medication side effects and warnings with the patient as well. I have reviewed the plan of care with the patient, accepted their input and they are in agreement with the treatment goals. Follow-up Disposition: 
Return in about 6 months (around 3/20/2019) for physical, labs at next visit, EKG next visit.

## 2018-09-20 NOTE — MR AVS SNAPSHOT
[facility] 
 
 
 [address] [facility] [address] [facility] [address] [address] 
[phone] Patient: Lina Miranda MRN: [id number] PIV:[id number] Visit Information Date & Time Provider Department Dept. Phone Encounter #  
 9/20/2018  8:30 AM Valeria Greene, [facility] [phone] [id number] Follow-up Instructions Return in about 6 months (around 3/20/2019) for physical, labs at next visit, EKG next visit. Follow-up and Disposition History Your Appointments 11/15/2018  9:00 AM  
Follow Up with Valeria Viveros.,  [address] [address]) Appt Note: 6 months (around 11/16/2018) for EOV, HGAIC next visit [id number] [facility] [address] [facility] [address]  
  
   
 [address] [facility] [address] Upcoming Health Maintenance Date Due  
 EYE EXAM RETINAL OR DILATED Q1 3/15/2012 FOBT Q 1 YEAR AGE 50-75 4/4/2016 FOOT EXAM Q1 11/3/2017 Influenza Age 5 to Adult 8/1/2018 HEMOGLOBIN A1C Q6M 10/23/2018 MICROALBUMIN Q1 4/23/2019 LIPID PANEL Q1 4/23/2019 BREAST CANCER SCRN MAMMOGRAM 7/11/2020 DTaP/Tdap/Td series (2 - Td) 5/16/2026 Allergies as of 9/20/2018  Review Complete On: 9/20/2018 By: Valeria Greene,  No Known Allergies Current Immunizations  Reviewed on 9/20/2018 Name Date Influenza Vaccine 10/9/2014, 12/5/2013  8:30 AM, 12/6/2012 Influenza Vaccine [employer]) 9/23/2015 Influenza Vaccine (Quad) PF 9/20/2018, 11/3/2016 Influenza Vaccine Split 10/4/2011 Pneumococcal Conjugate (PCV-13) 9/23/2015 Pneumococcal Polysaccharide (PPSV-23) 11/3/2016 TD Vaccine 9/16/2007 Tdap 5/16/2016 Reviewed by Melo Tolentino LPN on 1/84/2630 at  8:55 AM  
You Were Diagnosed With   
  
 Codes Comments  Controlled type 2 diabetes mellitus with complication, without long-term current use of insulin ([facility]    -  Primary ICD-10-CM: E11.8 ICD-9-CM: 250.90 Encounter for immunization     ICD-10-CM: X37 ICD-9-CM: V03.89 Insomnia, unspecified type     ICD-10-CM: G47.00 ICD-9-CM: 780.52 Congenital hypothyroidism without goiter     ICD-10-CM: E03.1 ICD-9-CM: 456 Primary insomnia     ICD-10-CM: F51.01 
ICD-9-CM: 307.42 Vitals BP Pulse Temp Resp Height(growth percentile) Weight(growth percentile) 136/82 (BP 1 Location: Right arm, BP Patient Position: Sitting) 98 97.9 °F (36.6 °C) (Oral) 19 5' 5\" (1.651 m) 197 lb (89.4 kg) SpO2 BMI OB Status Smoking Status 100% 32.78 kg/m2 Hysterectomy Never Smoker BMI and BSA Data Body Mass Index Body Surface Area 32.78 kg/m 2 2.02 m 2 Preferred Pharmacy Pharmacy Name Phone Alea Kelly, [facility] [phone] Your Updated Medication List  
  
   
This list is accurate as of 9/20/18  9:11 AM.  Always use your most recent med list.  
  
  
  
  
 levothyroxine 200 mcg tablet Commonly known as:  SYNTHROID Take 1 Tab by mouth Daily (before breakfast). metFORMIN 500 mg tablet Commonly known as:  GLUCOPHAGE Take 1 Tab by mouth two (2) times daily (with meals). sertraline 50 mg tablet Commonly known as:  ZOLOFT Take 1 Tab by mouth nightly. traZODone 50 mg tablet Commonly known as:  Oletta Umu Take 1 Tab by mouth nightly. zolpidem 10 mg tablet Commonly known as:  AMBIEN Take 1 Tab by mouth nightly as needed. Prescriptions Printed Refills  
 traZODone (DESYREL) 50 mg tablet 4 Sig: Take 1 Tab by mouth nightly. Class: Print Route: Oral  
 sertraline (ZOLOFT) 50 mg tablet 4 Sig: Take 1 Tab by mouth nightly. Class: Print Route: Oral  
 zolpidem (AMBIEN) 10 mg tablet 1 Sig: Take 1 Tab by mouth nightly as needed. Class: Print  Route: Oral  
 levothyroxine (SYNTHROID) 200 mcg tablet 1 Sig: Take 1 Tab by mouth Daily (before breakfast). Class: Print Route: Oral  
 metFORMIN (GLUCOPHAGE) 500 mg tablet 4 Sig: Take 1 Tab by mouth two (2) times daily (with meals). Class: Print Route: Oral  
  
We Performed the Following AMB POC HEMOGLOBIN A1C [22087 CPT(R)]  DIABETES EYE EXAM [6 Custom] Comments:  
 Dr Jocelyn Cameron in [place] did her eye exam 7/18  DIABETES FOOT EXAM [7 Custom] INFLUENZA VIRUS VAC QUAD,SPLIT,PRESV FREE SYRINGE IM [id number] CPT(R)] IN IMMUNIZ ADMIN,1 SINGLE/COMB VAC/TOXOID [id number] CPT(R)] Follow-up Instructions Return in about 6 months (around 3/20/2019) for physical, labs at next visit, EKG next visit. Introducing [facility]! Dear Patrick Hyatt: Thank you for requesting a [employer] account. Our records indicate that you already have an active [employer] account. You can access your account anytime at [email or web address] Did you know that you can access your hospital and ER discharge instructions at any time in [employer]? You can also review all of your test results from your hospital stay or ER visit. Additional Information If you have questions, please visit the Frequently Asked Questions section of the [employer] website at [email or web address] Remember, [employer] is NOT to be used for urgent needs. For medical emergencies, dial 911. Now available from your iPhone and Android! Please provide this summary of care documentation to your next provider. Your primary care clinician is listed as [id number] [facility]. If you have any questions after today's visit, please call [phone].

## 2018-09-20 NOTE — PROGRESS NOTES
Room # SUBJECTIVE: 
 
Kearney Bosworth is a 47 y.o. female who presents today for follow up for thyroid and diabetes 1. Have you been to the ER, urgent care clinic since your last visit? Hospitalized since your last visit? NO 
 
2. Have you seen or consulted any other health care providers outside of the [address] [place] [address] since your last visit? Include any pap smears or colon screening. NO When : 
Reason: 
 
Health Maintenance reviewed Yes Health Maintenance Due Topic Date Due  
 EYE EXAM RETINAL OR DILATED Q1  03/15/2012  FOBT Q 1 YEAR AGE 50-75  04/04/2016  
 FOOT EXAM Q1  11/03/2017  Influenza Age 5 to Adult  08/01/2018

## 2018-11-04 DIAGNOSIS — F51.01 PRIMARY INSOMNIA: ICD-10-CM

## 2018-11-04 DIAGNOSIS — E03.1 CONGENITAL HYPOTHYROIDISM WITHOUT GOITER: ICD-10-CM

## 2018-11-04 DIAGNOSIS — Z00.00 ROUTINE GENERAL MEDICAL EXAMINATION AT A HEALTH CARE FACILITY: ICD-10-CM

## 2018-11-04 DIAGNOSIS — E11.8 CONTROLLED TYPE 2 DIABETES MELLITUS WITH COMPLICATION, WITHOUT LONG-TERM CURRENT USE OF INSULIN (HCC): ICD-10-CM

## 2018-11-04 RX ORDER — LEVOTHYROXINE SODIUM 200 UG/1
TABLET ORAL
Qty: 90 TAB | Refills: 0 | Status: SHIPPED | OUTPATIENT
Start: 2018-11-04 | End: 2019-04-16

## 2019-04-12 ENCOUNTER — HOSPITAL ENCOUNTER (OUTPATIENT)
Dept: LAB | Age: 54
Discharge: HOME | End: 2019-04-12
Payer: OTHER GOVERNMENT

## 2019-04-12 DIAGNOSIS — E03.1 CONGENITAL HYPOTHYROIDISM WITHOUT GOITER: ICD-10-CM

## 2019-04-12 DIAGNOSIS — E11.22 CONTROLLED TYPE 2 DIABETES MELLITUS WITH CHRONIC KIDNEY DISEASE, UNSPECIFIED CKD STAGE, UNSPECIFIED WHETHER LONG TERM INSULIN USE (HCC): ICD-10-CM

## 2019-04-12 LAB
ALBUMIN SERPL-MCNC: 4 G/DL (ref 3.4–5)
ALBUMIN/GLOB SERPL: 1.1 {RATIO} (ref 0.8–1.7)
ALP SERPL-CCNC: 73 U/L (ref 45–117)
ALT SERPL-CCNC: 20 U/L (ref 13–56)
ANION GAP SERPL CALC-SCNC: 7 MMOL/L (ref 3–18)
APPEARANCE UR: CLEAR
AST SERPL-CCNC: 11 U/L (ref 15–37)
BASOPHILS # BLD: 0 K/UL (ref 0–0.1)
BASOPHILS NFR BLD: 0 % (ref 0–2)
BILIRUB SERPL-MCNC: 0.4 MG/DL (ref 0.2–1)
BILIRUB UR QL: NEGATIVE
BUN SERPL-MCNC: 19 MG/DL (ref 7–18)
BUN/CREAT SERPL: 25 (ref 12–20)
CALCIUM SERPL-MCNC: 8.9 MG/DL (ref 8.5–10.1)
CHLORIDE SERPL-SCNC: 103 MMOL/L (ref 100–108)
CHOLEST SERPL-MCNC: 236 MG/DL
CO2 SERPL-SCNC: 27 MMOL/L (ref 21–32)
COLOR UR: YELLOW
CREAT SERPL-MCNC: 0.75 MG/DL (ref 0.6–1.3)
CREAT UR-MCNC: 49 MG/DL (ref 30–125)
DIFFERENTIAL METHOD BLD: ABNORMAL
EOSINOPHIL # BLD: 0.1 K/UL (ref 0–0.4)
EOSINOPHIL NFR BLD: 1 % (ref 0–5)
ERYTHROCYTE [DISTWIDTH] IN BLOOD BY AUTOMATED COUNT: 13.2 % (ref 11.6–14.5)
EST. AVERAGE GLUCOSE BLD GHB EST-MCNC: 183 MG/DL
GLOBULIN SER CALC-MCNC: 3.7 G/DL (ref 2–4)
GLUCOSE SERPL-MCNC: 192 MG/DL (ref 74–99)
GLUCOSE UR STRIP.AUTO-MCNC: 250 MG/DL
HBA1C MFR BLD: 8 % (ref 4.2–5.6)
HCT VFR BLD AUTO: 41.1 % (ref 35–45)
HDLC SERPL-MCNC: 36 MG/DL (ref 40–60)
HDLC SERPL: 6.6 {RATIO} (ref 0–5)
HGB BLD-MCNC: 13.6 G/DL (ref 12–16)
HGB UR QL STRIP: NEGATIVE
KETONES UR QL STRIP.AUTO: NEGATIVE MG/DL
LDLC SERPL CALC-MCNC: ABNORMAL MG/DL (ref 0–100)
LEUKOCYTE ESTERASE UR QL STRIP.AUTO: NEGATIVE
LIPID PROFILE,FLP: ABNORMAL
LYMPHOCYTES # BLD: 1.1 K/UL (ref 0.9–3.6)
LYMPHOCYTES NFR BLD: 20 % (ref 21–52)
MCH RBC QN AUTO: 27.5 PG (ref 24–34)
MCHC RBC AUTO-ENTMCNC: 33.1 G/DL (ref 31–37)
MCV RBC AUTO: 83.2 FL (ref 74–97)
MICROALBUMIN UR-MCNC: 1.21 MG/DL (ref 0–3)
MICROALBUMIN/CREAT UR-RTO: 25 MG/G (ref 0–30)
MONOCYTES # BLD: 0.3 K/UL (ref 0.05–1.2)
MONOCYTES NFR BLD: 4 % (ref 3–10)
NEUTS SEG # BLD: 4.2 K/UL (ref 1.8–8)
NEUTS SEG NFR BLD: 75 % (ref 40–73)
NITRITE UR QL STRIP.AUTO: NEGATIVE
PH UR STRIP: 5 [PH] (ref 5–8)
PLATELET # BLD AUTO: 276 K/UL (ref 135–420)
PMV BLD AUTO: 8.6 FL (ref 9.2–11.8)
POTASSIUM SERPL-SCNC: 4.2 MMOL/L (ref 3.5–5.5)
PROT SERPL-MCNC: 7.7 G/DL (ref 6.4–8.2)
PROT UR STRIP-MCNC: NEGATIVE MG/DL
RBC # BLD AUTO: 4.94 M/UL (ref 4.2–5.3)
SODIUM SERPL-SCNC: 137 MMOL/L (ref 136–145)
SP GR UR REFRACTOMETRY: 1.02 (ref 1–1.03)
TRIGL SERPL-MCNC: 440 MG/DL (ref ?–150)
TSH SERPL DL<=0.05 MIU/L-ACNC: 0.15 UIU/ML (ref 0.36–3.74)
UROBILINOGEN UR QL STRIP.AUTO: 0.2 EU/DL (ref 0.2–1)
VLDLC SERPL CALC-MCNC: ABNORMAL MG/DL
WBC # BLD AUTO: 5.7 K/UL (ref 4.6–13.2)

## 2019-04-12 PROCEDURE — 84443 ASSAY THYROID STIM HORMONE: CPT

## 2019-04-12 PROCEDURE — 80053 COMPREHEN METABOLIC PANEL: CPT

## 2019-04-12 PROCEDURE — 85025 COMPLETE CBC W/AUTO DIFF WBC: CPT

## 2019-04-12 PROCEDURE — 81003 URINALYSIS AUTO W/O SCOPE: CPT

## 2019-04-12 PROCEDURE — 36415 COLL VENOUS BLD VENIPUNCTURE: CPT

## 2019-04-12 PROCEDURE — 82043 UR ALBUMIN QUANTITATIVE: CPT

## 2019-04-12 PROCEDURE — 83036 HEMOGLOBIN GLYCOSYLATED A1C: CPT

## 2019-04-12 PROCEDURE — 80061 LIPID PANEL: CPT

## 2019-04-16 ENCOUNTER — OFFICE VISIT (OUTPATIENT)
Dept: FAMILY MEDICINE CLINIC | Age: 54
End: 2019-04-16

## 2019-04-16 VITALS
HEIGHT: 65 IN | TEMPERATURE: 97.9 F | BODY MASS INDEX: 31.96 KG/M2 | DIASTOLIC BLOOD PRESSURE: 80 MMHG | HEART RATE: 84 BPM | SYSTOLIC BLOOD PRESSURE: 125 MMHG | OXYGEN SATURATION: 100 % | RESPIRATION RATE: 19 BRPM | WEIGHT: 191.8 LBS

## 2019-04-16 DIAGNOSIS — E03.1 CONGENITAL HYPOTHYROIDISM WITHOUT GOITER: ICD-10-CM

## 2019-04-16 DIAGNOSIS — F51.01 PRIMARY INSOMNIA: ICD-10-CM

## 2019-04-16 DIAGNOSIS — G47.00 INSOMNIA, UNSPECIFIED TYPE: ICD-10-CM

## 2019-04-16 DIAGNOSIS — Z00.00 ANNUAL PHYSICAL EXAM: Primary | ICD-10-CM

## 2019-04-16 DIAGNOSIS — E11.8 CONTROLLED TYPE 2 DIABETES MELLITUS WITH COMPLICATION, WITHOUT LONG-TERM CURRENT USE OF INSULIN (HCC): ICD-10-CM

## 2019-04-16 DIAGNOSIS — Z23 ENCOUNTER FOR IMMUNIZATION: ICD-10-CM

## 2019-04-16 RX ORDER — ZOLPIDEM TARTRATE 10 MG/1
10 TABLET ORAL
Qty: 90 TAB | Refills: 1 | Status: SHIPPED | OUTPATIENT
Start: 2019-04-16

## 2019-04-16 RX ORDER — TRAZODONE HYDROCHLORIDE 50 MG/1
50 TABLET ORAL
Qty: 90 TAB | Refills: 4 | Status: SHIPPED | OUTPATIENT
Start: 2019-04-16

## 2019-04-16 RX ORDER — ATORVASTATIN CALCIUM 10 MG/1
10 TABLET, FILM COATED ORAL DAILY
Qty: 90 TAB | Refills: 4 | Status: SHIPPED | OUTPATIENT
Start: 2019-04-16

## 2019-04-16 RX ORDER — LEVOTHYROXINE SODIUM 200 UG/1
200 TABLET ORAL
Qty: 90 TAB | Refills: 1 | Status: SHIPPED | OUTPATIENT
Start: 2019-04-16

## 2019-04-16 RX ORDER — SERTRALINE HYDROCHLORIDE 50 MG/1
50 TABLET, FILM COATED ORAL
Qty: 90 TAB | Refills: 4 | Status: SHIPPED | OUTPATIENT
Start: 2019-04-16

## 2019-04-16 NOTE — PROGRESS NOTES
Dona Peabody is a 49 y.o.  female and presents for a preventive health care visit Subjective: 
Health Maintenance History Immunizations reviewed, none indicated. Mammogram : utd nl , dexascan: utd nl ,pap smear : na , 
 colonoscopy: utd , Eye exam: utd  ,  Chest CT: na , 
 
HPI ; 
 
Patient Active Problem List  
Diagnosis Code  Insomnia G47.00  
 Diabetes mellitus type 2, controlled ([facility] E11.9  Congenital hypothyroidism without goiter E03.1 Patient Active Problem List  
 Diagnosis Date Noted  Congenital hypothyroidism without goiter 10/22/2015  Diabetes mellitus type 2, controlled ([facility] 09/23/2015  Insomnia 06/30/2011 Current Outpatient Medications Medication Sig Dispense Refill  traZODone (DESYREL) 50 mg tablet Take 1 Tab by mouth nightly. 90 Tab 4  
 sertraline (ZOLOFT) 50 mg tablet Take 1 Tab by mouth nightly. 90 Tab 4  
 zolpidem (AMBIEN) 10 mg tablet Take 1 Tab by mouth nightly as needed for Sleep. 90 Tab 1  
 levothyroxine (SYNTHROID) 200 mcg tablet Take 1 Tab by mouth Daily (before breakfast). 90 Tab 1  
 atorvastatin (LIPITOR) 10 mg tablet Take 1 Tab by mouth daily. 90 Tab 4  
 SITagliptin-metFORMIN (JANUMET)  mg per tablet Take 1 Tab by mouth two (2) times daily (with meals). 180 Tab 4 No Known Allergies Past Medical History:  
Diagnosis Date  Hypothyroid 9/16/2010  Insomnia 6/30/2011  Menopause  Thyroid disease Past Surgical History:  
Procedure Laterality Date  HX GYN    
 hysterectomy  HX HYSTERECTOMY Family History Problem Relation Age of Onset  Breast Cancer Sister 48  Cancer Father Social History Tobacco Use  Smoking status: Never Smoker  Smokeless tobacco: Never Used Substance Use Topics  Alcohol use: Yes ROS General: negative for - chills, fatigue, fever, weight change Psych: negative for - anxiety, depression, irritability or mood swings ENT: negative for - headaches, hearing change, nasal congestion, oral lesions, sneezing or sore throat Heme/ Lymph: negative for - bleeding problems, bruising, pallor or swollen lymph nodes Endo: negative for - hot flashes, polydipsia/polyuria or temperature intolerance Resp: negative for - cough, shortness of breath or wheezing CV: negative for - chest pain, edema or palpitations GI: negative for - abdominal pain, change in bowel habits, constipation, diarrhea or nausea/vomiting : negative for - dysuria, hematuria, incontinence, pelvic pain or vulvar/vaginal symptoms MSK: negative for - joint pain, joint swelling or muscle pain Neuro: negative for - confusion, headaches, seizures or weakness Derm: negative for - dry skin, hair changes, rash or skin lesion changes Objective: 
Vitals:  
 04/16/19 0906 BP: 125/80 Pulse: 84 Resp: 19 Temp: 97.9 °F (36.6 °C) TempSrc: Oral  
SpO2: 100% Weight: 191 lb 12.8 oz (87 kg) Height: 5' 5\" (1.651 m) PainSc:   0 - No pain  
 
 
alert, well appearing, and in no distress and oriented to person, place, and time Mental status - alert, oriented to person, place, and time Eyes - pupils equal and reactive, extraocular eye movements intact Ears - bilateral TM's and external ear canals normal 
Nose - normal and patent, no erythema, discharge or polyps Mouth - mucous membranes moist, pharynx normal without lesions Neck - supple, no significant adenopathy Lymphatics - no palpable lymphadenopathy, no hepatosplenomegaly Chest - clear to auscultation, no wheezes, rales or rhonchi, symmetric air entry Heart - normal rate, regular rhythm, normal S1, S2, no murmurs, rubs, clicks or gallops Abdomen - soft, nontender, nondistended, no masses or organomegaly Breasts - breasts appear normal, no suspicious masses, no skin or nipple changes or axillary nodes Back exam - full range of motion, no tenderness, palpable spasm or pain on motion Neurological - alert, oriented, normal speech, no focal findings or movement disorder noted Musculoskeletal - no joint tenderness, deformity or swelling Extremities - peripheral pulses normal, no pedal edema, no clubbing or cyanosis Skin - normal coloration and turgor, no rashes, no suspicious skin lesions noted LABS Component Latest Ref Rng & Units 4/12/2019 4/12/2019 4/12/2019  
 
      8:37 AM  8:37 AM  8:37 AM  
WBC 
    4.6 - 13.2 K/uL     
RBC 
    4.20 - 5.30 M/uL     
HGB 12.0 - 16.0 g/dL HCT 
    35.0 - 45.0 % MCV 
    74.0 - 97.0 FL     
MCH 
    24.0 - 34.0 PG     
MCHC 31.0 - 37.0 g/dL     
RDW 
    11.6 - 14.5 % PLATELET 
    870 - 552 K/uL MPV 
    9.2 - 11.8 FL     
NEUTROPHILS 
    40 - 73 % LYMPHOCYTES 
    21 - 52 % MONOCYTES 
    3 - 10 % EOSINOPHILS 
    0 - 5 % BASOPHILS 
    0 - 2 %     
ABS. NEUTROPHILS 
    1.8 - 8.0 K/UL     
ABS. LYMPHOCYTES 
    0.9 - 3.6 K/UL     
ABS. MONOCYTES 
    0.05 - 1.2 K/UL     
ABS. EOSINOPHILS 
    0.0 - 0.4 K/UL     
ABS. BASOPHILS 
    0.0 - 0.1 K/UL     
DF Sodium 136 - 145 mmol/L   137 Potassium 3.5 - 5.5 mmol/L   4.2 Chloride 100 - 108 mmol/L   103 CO2 
    21 - 32 mmol/L   27 Anion gap 3.0 - 18 mmol/L   7 Glucose 74 - 99 mg/dL   192 (H) BUN 
    7.0 - 18 MG/DL   19 (H) Creatinine 
    0.6 - 1.3 MG/DL   0.75  
BUN/Creatinine ratio 12 - 20     25 (H) GFR est AA 
    >60 ml/min/1.73m2   >60  
GFR est non-AA 
    >60 ml/min/1.73m2   >60 Calcium 8.5 - 10.1 MG/DL   8.9 Bilirubin, total 
    0.2 - 1.0 MG/DL   0.4 ALT (SGPT) 13 - 56 U/L   20 AST 
    15 - 37 U/L   11 (L) Alk. phosphatase 45 - 117 U/L   73 Protein, total 
    6.4 - 8.2 g/dL   7.7 Albumin 3.4 - 5.0 g/dL   4.0 Globulin 2.0 - 4.0 g/dL   3.7 A-G Ratio 
    0.8 - 1.7     1.1 Color Appearance Specific gravity 1.005 - 1.030       
pH (UA) 
    5.0 - 8.0 Protein NEG mg/dL Glucose NEG mg/dL Ketone NEG mg/dL Bilirubin NEG Blood NEG Urobilinogen 0.2 - 1.0 EU/dL Nitrites NEG Leukocyte Esterase NEG Cholesterol, total 
    <200 MG/DL Triglyceride 
    <150 MG/DL     
HDL Cholesterol 40 - 60 MG/DL     
LDL, calculated 0 - 100 MG/DL VLDL, calculated MG/DL     
CHOL/HDL Ratio 0 - 5.0 Microalbumin,urine random 0 - 3.0 MG/DL 1.21 Creatinine, urine 30 - 125 mg/dL 49.00 Microalbumin/Creat. Ratio 0 - 30 mg/g 25 Hemoglobin A1c, (calculated) 4.2 - 5.6 % Est. average glucose 
    mg/dL TSH 
    0.36 - 3.74 uIU/mL  0.15 (L) Component Latest Ref Rng & Units 4/12/2019  
 
      8:37 AM  
WBC 
    4.6 - 13.2 K/uL   
RBC 
    4.20 - 5.30 M/uL   
HGB 12.0 - 16.0 g/dL HCT 
    35.0 - 45.0 % MCV 
    74.0 - 97.0 FL   
MCH 
    24.0 - 34.0 PG   
MCHC 31.0 - 37.0 g/dL   
RDW 
    11.6 - 14.5 % PLATELET 
    150 - 150 K/uL MPV 
    9.2 - 11.8 FL   
NEUTROPHILS 
    40 - 73 % LYMPHOCYTES 
    21 - 52 % MONOCYTES 
    3 - 10 % EOSINOPHILS 
    0 - 5 % BASOPHILS 
    0 - 2 %   
ABS. NEUTROPHILS 
    1.8 - 8.0 K/UL   
ABS. LYMPHOCYTES 
    0.9 - 3.6 K/UL   
ABS. MONOCYTES 
    0.05 - 1.2 K/UL   
ABS. EOSINOPHILS 
    0.0 - 0.4 K/UL   
ABS. BASOPHILS 
    0.0 - 0.1 K/UL   
DF Sodium 136 - 145 mmol/L Potassium 3.5 - 5.5 mmol/L Chloride 100 - 108 mmol/L   
CO2 
    21 - 32 mmol/L Anion gap 3.0 - 18 mmol/L Glucose 74 - 99 mg/dL BUN 
    7.0 - 18 MG/DL Creatinine 
    0.6 - 1.3 MG/DL   
BUN/Creatinine ratio 12 - 20 GFR est AA 
    >60 ml/min/1.73m2 GFR est non-AA 
    >60 ml/min/1.73m2 Calcium 8.5 - 10.1 MG/DL Bilirubin, total 
    0.2 - 1.0 MG/DL   
ALT (SGPT) 13 - 56 U/L   
AST 
    15 - 37 U/L Alk. phosphatase 45 - 117 U/L Protein, total 
    6.4 - 8.2 g/dL Albumin 3.4 - 5.0 g/dL Globulin 2.0 - 4.0 g/dL A-G Ratio 
    0.8 - 1.7 Color Appearance Specific gravity 1.005 - 1.030     
pH (UA) 
    5.0 - 8.0 Protein NEG mg/dL Glucose NEG mg/dL Ketone NEG mg/dL Bilirubin NEG Blood NEG Urobilinogen 0.2 - 1.0 EU/dL Nitrites NEG Leukocyte Esterase NEG Cholesterol, total 
    <200 MG/ (H) Triglyceride 
    <150 MG/ (H) HDL Cholesterol 40 - 60 MG/DL 36 (L)  
LDL, calculated 0 - 100 MG/DL LDL AND VLDL CHOLESTEROL NOT CALCULATED WHEN TRIGLYCERIDES >400 MG/DL OR HDL CHOLESTEROL <20 MG/DL VLDL, calculated MG/DL Calculation not valid with this patient's other Lipid values. CHOL/HDL Ratio 0 - 5.0   6.6 (H) Microalbumin,urine random 0 - 3.0 MG/DL Creatinine, urine 30 - 125 mg/dL Microalbumin/Creat. Ratio 0 - 30 mg/g Hemoglobin A1c, (calculated) 4.2 - 5.6 % Est. average glucose 
    mg/dL TSH 
    0.36 - 3.74 uIU/mL Component Latest Ref Rng & Units 4/12/2019 4/12/2019 4/12/2019  
 
      8:37 AM  8:37 AM  8:37 AM  
WBC 
    4.6 - 13.2 K/uL   5.7  
RBC 
    4.20 - 5.30 M/uL   4.94  
HGB 12.0 - 16.0 g/dL   13.6 HCT 
    35.0 - 45.0 %   41.1 MCV 
    74.0 - 97.0 FL   83.2 MCH 
    24.0 - 34.0 PG   27.5 MCHC 31.0 - 37.0 g/dL   33.1 RDW 
    11.6 - 14.5 %   13.2 PLATELET 
    747 - 726 K/uL   276 MPV 
    9.2 - 11.8 FL   8.6 (L) NEUTROPHILS 
    40 - 73 %   75 (H) LYMPHOCYTES 
    21 - 52 %   20 (L) MONOCYTES 
    3 - 10 %   4 EOSINOPHILS 
    0 - 5 %   1  
BASOPHILS 
    0 - 2 %   0  
ABS. NEUTROPHILS 
    1.8 - 8.0 K/UL   4.2  
ABS. LYMPHOCYTES 
    0.9 - 3.6 K/UL   1.1  
ABS. MONOCYTES 
    0.05 - 1.2 K/UL   0.3  
ABS. EOSINOPHILS 0.0 - 0.4 K/UL   0.1 ABS. BASOPHILS 
    0.0 - 0.1 K/UL   0.0  
DF AUTOMATED Sodium 136 - 145 mmol/L Potassium 3.5 - 5.5 mmol/L Chloride 100 - 108 mmol/L     
CO2 
    21 - 32 mmol/L Anion gap 3.0 - 18 mmol/L Glucose 74 - 99 mg/dL BUN 
    7.0 - 18 MG/DL Creatinine 
    0.6 - 1.3 MG/DL     
BUN/Creatinine ratio 12 - 20 GFR est AA 
    >60 ml/min/1.73m2 GFR est non-AA 
    >60 ml/min/1.73m2 Calcium 8.5 - 10.1 MG/DL Bilirubin, total 
    0.2 - 1.0 MG/DL     
ALT (SGPT) 13 - 56 U/L     
AST 
    15 - 37 U/L Alk. phosphatase 45 - 117 U/L Protein, total 
    6.4 - 8.2 g/dL Albumin 3.4 - 5.0 g/dL Globulin 2.0 - 4.0 g/dL A-G Ratio 
    0.8 - 1.7 Color YELLOW Appearance CLEAR Specific gravity 1.005 - 1.030    1.016   
pH (UA) 
    5.0 - 8.0    5.0 Protein NEG mg/dL  NEGATIVE Glucose NEG mg/dL  250 (A) Ketone NEG mg/dL  NEGATIVE Bilirubin NEG    NEGATIVE Blood NEG    NEGATIVE Urobilinogen 0.2 - 1.0 EU/dL  0.2 Nitrites NEG    NEGATIVE Leukocyte Esterase NEG    NEGATIVE Cholesterol, total 
    <200 MG/DL Triglyceride 
    <150 MG/DL     
HDL Cholesterol 40 - 60 MG/DL     
LDL, calculated 0 - 100 MG/DL VLDL, calculated MG/DL     
CHOL/HDL Ratio 0 - 5.0 Microalbumin,urine random 0 - 3.0 MG/DL Creatinine, urine 30 - 125 mg/dL Microalbumin/Creat. Ratio 0 - 30 mg/g Hemoglobin A1c, (calculated) 4.2 - 5.6 % 8.0 (H) Est. average glucose 
    mg/dL 183 TSH 
    0.36 - 3.74 uIU/mL TESTS 
ekg  [id number] Assessment/Plan:   
Health Maintenance up to date. Recommend f/u physical 1 year.  
Routine screening labs/tests recommended prior to next physical. 
 
 
 
 
 
 
 I have discussed the diagnosis with the patient and the intended plan as seen in the above orders. The patient has received an after-visit summary and questions were answered concerning future plans. I have discussed medication side effects and warnings with the patient as well. I have reviewed the plan of care with the patient, accepted their input and they are in agreement with the treatment goals. Follow-up and Dispositions · Return in about 3 months (around 7/16/2019) for EOV, HGAIC next visit. 
  
 
 
 
 
------------------------------------------------------------------------------------------------------------------- 
 
Problem Assessment 
and also with Chief Complaint Patient presents with  Diabetes  Thyroid Problem  Insomnia HPI ; 
Cardiovascular Review: 
The patient has diabetes and hyperlipidemia. Diet and Lifestyle: not attempting to follow a low fat, low cholesterol diet Home BP Monitoring: is not measured at home. Pertinent ROS: taking medications as instructed, no medication side effects noted, no TIA's, no chest pain on exertion, no dyspnea on exertion, no swelling of ankles. Diabetes Mellitus: 
She has diabetes mellitus, and  diabetes, hypertension and hyperlipidemia. Diabetic ROS - diabetic diet compliance: compliant all of the time. Lab review: labs are reviewed, up to date and normal.  
Thyroid Review: 
Patient is seen for followup of hypothyroidism. Thyroid ROS: denies fatigue, weight changes, heat/cold intolerance, bowel/skin changes or CVS symptoms. Insomnia ongoing Additional Concerns:   
 
 
 
 
 
Assessment/Plan:   
 
Diabetes - needs improvement -- switch to janumet and f/u 3 mo recheck Hypertension - stable Hyperlipidemia - needs improvement, start lipitor and recheck in 3 mo Insomnia stable Thyroid optimal 
 
 
Diagnoses and all orders for this visit: 
 
1.  Annual physical exam 
-     AMB POC EKG ROUTINE W/ 12 LEADS, INTER & REP 
 
 2. Congenital hypothyroidism without goiter 
-     traZODone (DESYREL) 50 mg tablet; Take 1 Tab by mouth nightly. -     sertraline (ZOLOFT) 50 mg tablet; Take 1 Tab by mouth nightly. -     zolpidem (AMBIEN) 10 mg tablet; Take 1 Tab by mouth nightly as needed for Sleep. -     levothyroxine (SYNTHROID) 200 mcg tablet; Take 1 Tab by mouth Daily (before breakfast). 3. Controlled type 2 diabetes mellitus with complication, without long-term current use of insulin (HCC) 
-     traZODone (DESYREL) 50 mg tablet; Take 1 Tab by mouth nightly. -     sertraline (ZOLOFT) 50 mg tablet; Take 1 Tab by mouth nightly. -     zolpidem (AMBIEN) 10 mg tablet; Take 1 Tab by mouth nightly as needed for Sleep. -     levothyroxine (SYNTHROID) 200 mcg tablet; Take 1 Tab by mouth Daily (before breakfast). -     REFERRAL TO OPHTHALMOLOGY 4. Insomnia, unspecified type 
-     traZODone (DESYREL) 50 mg tablet; Take 1 Tab by mouth nightly. -     sertraline (ZOLOFT) 50 mg tablet; Take 1 Tab by mouth nightly. -     zolpidem (AMBIEN) 10 mg tablet; Take 1 Tab by mouth nightly as needed for Sleep. 5. Primary insomnia 
-     traZODone (DESYREL) 50 mg tablet; Take 1 Tab by mouth nightly. -     sertraline (ZOLOFT) 50 mg tablet; Take 1 Tab by mouth nightly. -     zolpidem (AMBIEN) 10 mg tablet; Take 1 Tab by mouth nightly as needed for Sleep. -     levothyroxine (SYNTHROID) 200 mcg tablet; Take 1 Tab by mouth Daily (before breakfast). 6. Encounter for immunization 
-     ZOSTER VACC RECOMBINANT ADJUVANTED Other orders 
-     atorvastatin (LIPITOR) 10 mg tablet; Take 1 Tab by mouth daily. 
-     SITagliptin-metFORMIN (JANUMET)  mg per tablet; Take 1 Tab by mouth two (2) times daily (with meals).  
 
 
 
 
Lab review: labs are reviewed, up to date and normal

## 2019-04-16 NOTE — PROGRESS NOTES
Room # SUBJECTIVE: 
 
Evelyn Miles is a 49 y.o. female who presents today for complete physical with EKG 1. Have you been to the ER, urgent care clinic since your last visit? Hospitalized since your last visit? NO 
 
2. Have you seen or consulted any other health care providers outside of the [address] [place] [address] since your last visit? Include any pap smears or colon screening. NO When : 
Reason: 
 
Health Maintenance reviewed Yes Health Maintenance Due Topic Date Due  
 EYE EXAM RETINAL OR DILATED  04/04/1976  Shingrix Vaccine Age 50> (1 of 2) 04/04/2016  FOBT Q 1 YEAR AGE 50-75  04/04/2016

## 2019-10-08 NOTE — PATIENT DISCUSSION
DM2 WITHOUT RETINOPATHY OR CSME OU - EDUCATED ON FINDINGS. CONTINUE TO MONITOR BS/A1C W/ PCP AS DIRECTED. RTC X 1 YEAR.

## 2019-10-08 NOTE — PATIENT DISCUSSION
LOW RISK GLAUCOMA SUSPECT OU 2' LARGE CD &amp; FAMILY HX - IOP WNL OU. RNFL HEALTHY OU. OCT RNFL BASELINE OBTAINED TODAY; WNL OU. EDUCATED ON FINDINGS. MONITOR X 1 YEAR.

## 2020-08-26 ENCOUNTER — IMPORTED ENCOUNTER (OUTPATIENT)
Dept: URBAN - METROPOLITAN AREA CLINIC 1 | Facility: CLINIC | Age: 55
End: 2020-08-26

## 2020-08-26 PROBLEM — Z79.84: Noted: 2020-08-26

## 2020-08-26 PROBLEM — E11.9: Noted: 2020-08-26

## 2020-08-26 PROBLEM — H25.13: Noted: 2020-08-26

## 2020-08-26 PROCEDURE — 92015 DETERMINE REFRACTIVE STATE: CPT

## 2020-08-26 PROCEDURE — 92004 COMPRE OPH EXAM NEW PT 1/>: CPT

## 2020-08-26 NOTE — PATIENT DISCUSSION
1.  DM Type II (Oral Meds) -- without sign of diabetic retinopathy and no blot heme on dilated retinal examination today OU No Macular Edema:  Discussed the pathophysiology of diabetes and its effect on the eye and risk of blindness. Stressed the importance of strong glucose control. Advised of importance of at least yearly dilated examinations but to contact us immediately for any problems or concerns. 2. Nuclear Cataract OU -- Observe for now without intervention. The patient was advised to contact us if any change or worsening of visionMRX for glasses given. Letter to PCP. Return for an appointment in 1 year 27 with Dr. Zuly Vargas.

## 2021-05-04 NOTE — PATIENT DISCUSSION
LOW RISK GLAUCOMA SUSPECT OU 2' LARGE CD &amp; FAMILY HX - IOP WNL OU. RNFL HEALTHY OU. EDUCATED ON FINDINGS. MONITOR X 1 YEAR.

## 2022-04-02 ASSESSMENT — VISUAL ACUITY
OD_CC: 20/20
OS_CC: 20/25
OS_CC: J1
OD_CC: J1
OS_SC: 20/25
OD_SC: 20/25

## 2022-04-02 ASSESSMENT — TONOMETRY
OD_IOP_MMHG: 19
OS_IOP_MMHG: 20

## 2022-11-22 NOTE — PATIENT DISCUSSION
Discussed at length signs and symptoms of acute angle closure (including red eye, dilated pupil, blurred vision, red eye, eye pain, brow pain, headache, nausea/vomiting, seeing rainbow halos around lights) with instructions to RTC or call immediately.

## 2022-11-22 NOTE — PATIENT DISCUSSION
Prescribed preservative-free artificial tears 4-6x per day OU.  Return for follow-up as scheduled or sooner if symptoms persist.